# Patient Record
Sex: FEMALE | Race: WHITE | NOT HISPANIC OR LATINO | Employment: UNEMPLOYED
[De-identification: names, ages, dates, MRNs, and addresses within clinical notes are randomized per-mention and may not be internally consistent; named-entity substitution may affect disease eponyms.]

---

## 2017-10-15 ENCOUNTER — HEALTH MAINTENANCE LETTER (OUTPATIENT)
Age: 57
End: 2017-10-15

## 2021-03-18 ENCOUNTER — IMMUNIZATION (OUTPATIENT)
Dept: NURSING | Facility: CLINIC | Age: 61
End: 2021-03-18
Payer: COMMERCIAL

## 2021-03-18 PROCEDURE — 91300 PR COVID VAC PFIZER DIL RECON 30 MCG/0.3 ML IM: CPT

## 2021-03-18 PROCEDURE — 0001A PR COVID VAC PFIZER DIL RECON 30 MCG/0.3 ML IM: CPT

## 2021-04-08 ENCOUNTER — IMMUNIZATION (OUTPATIENT)
Dept: NURSING | Facility: CLINIC | Age: 61
End: 2021-04-08
Attending: INTERNAL MEDICINE
Payer: COMMERCIAL

## 2021-04-08 PROCEDURE — 91300 PR COVID VAC PFIZER DIL RECON 30 MCG/0.3 ML IM: CPT

## 2021-04-08 PROCEDURE — 0002A PR COVID VAC PFIZER DIL RECON 30 MCG/0.3 ML IM: CPT

## 2021-04-17 ENCOUNTER — HEALTH MAINTENANCE LETTER (OUTPATIENT)
Age: 61
End: 2021-04-17

## 2021-05-26 ENCOUNTER — RECORDS - HEALTHEAST (OUTPATIENT)
Dept: ADMINISTRATIVE | Facility: CLINIC | Age: 61
End: 2021-05-26

## 2021-09-24 ENCOUNTER — HOSPITAL ENCOUNTER (EMERGENCY)
Facility: HOSPITAL | Age: 61
Discharge: HOME | End: 2021-09-25
Payer: COMMERCIAL

## 2021-09-24 VITALS
DIASTOLIC BLOOD PRESSURE: 95 MMHG | BODY MASS INDEX: 19.83 KG/M2 | RESPIRATION RATE: 16 BRPM | OXYGEN SATURATION: 99 % | HEIGHT: 61 IN | SYSTOLIC BLOOD PRESSURE: 203 MMHG | HEART RATE: 79 BPM | TEMPERATURE: 98 F | WEIGHT: 105 LBS

## 2021-09-24 ASSESSMENT — MIFFLIN-ST. JEOR: SCORE: 988.66

## 2021-09-25 NOTE — ED TRIAGE NOTES
She states she went o plastic surgeon 2 weeks ago for a Ultherapy in both arms and now has shooting pains in both arms. She tried aspirin and tylenol with no releif, she also tried valium. Pain a 10.

## 2021-09-26 ENCOUNTER — HEALTH MAINTENANCE LETTER (OUTPATIENT)
Age: 61
End: 2021-09-26

## 2021-10-19 ENCOUNTER — TRANSFERRED RECORDS (OUTPATIENT)
Dept: HEALTH INFORMATION MANAGEMENT | Facility: CLINIC | Age: 61
End: 2021-10-19

## 2021-10-25 ENCOUNTER — TRANSFERRED RECORDS (OUTPATIENT)
Dept: HEALTH INFORMATION MANAGEMENT | Facility: CLINIC | Age: 61
End: 2021-10-25

## 2021-11-21 ENCOUNTER — HEALTH MAINTENANCE LETTER (OUTPATIENT)
Age: 61
End: 2021-11-21

## 2022-04-01 NOTE — TELEPHONE ENCOUNTER
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 PCP:  10/27/21  9/29/21    Dr Murphy @ [facility]:  10/25/21  10/13/21   MEDICATION LIST Care Everywhere    IMAGING  (FOR ALL VISITS)     EMG Received [facility]:  10/19/21   MRI (HEAD, NECK, SPINE) Received [facility]:  MRA Head Neck 7/16/12  MRI Brain 7/16/12

## 2022-05-08 ENCOUNTER — HEALTH MAINTENANCE LETTER (OUTPATIENT)
Age: 62
End: 2022-05-08

## 2022-06-27 ENCOUNTER — PRE VISIT (OUTPATIENT)
Dept: NEUROLOGY | Facility: CLINIC | Age: 62
End: 2022-06-27

## 2022-06-27 ENCOUNTER — OFFICE VISIT (OUTPATIENT)
Dept: NEUROLOGY | Facility: CLINIC | Age: 62
End: 2022-06-27
Payer: COMMERCIAL

## 2022-06-27 VITALS
RESPIRATION RATE: 16 BRPM | SYSTOLIC BLOOD PRESSURE: 162 MMHG | BODY MASS INDEX: 20.6 KG/M2 | WEIGHT: 109 LBS | DIASTOLIC BLOOD PRESSURE: 81 MMHG | OXYGEN SATURATION: 99 % | HEART RATE: 82 BPM

## 2022-06-27 DIAGNOSIS — G54.0 BRACHIAL PLEXOPATHY: Primary | ICD-10-CM

## 2022-06-27 PROCEDURE — 99204 OFFICE O/P NEW MOD 45 MIN: CPT | Performed by: PSYCHIATRY & NEUROLOGY

## 2022-06-27 ASSESSMENT — PAIN SCALES - GENERAL: PAINLEVEL: MODERATE PAIN (4)

## 2022-06-27 NOTE — LETTER
Date:June 27, 2022      Provider requested that no letter be sent. Do not send.       [facility]

## 2022-06-27 NOTE — LETTER
6/27/2022       RE: Brii Rainey  [address] [facility] [address]     Dear Colleague,    Thank you for referring your patient, Brii Rainey, to the [facility] NEUROLOGY CLINIC [place] at [facility]. Please see a copy of my visit note below.    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 On 9/10/21 she underwent a cosmetic procedure called ultherapy. As part of this procedure apparently a probe is applied to the axilla and sends ultrasound into the skin. It is designed to smooth skin through collagen stimulation. The procedure lasted for 30 min on the left and 45 minutes on the right. During the procedure she experienced electrical shocks into her elbows, forearm and fingers. Locally in her axilla she felt a warm sensation. Immediately after the procedure she hand numbness and burning in her 4th and 5th digits as well as her medial forearms on both sides. The right side was more affected than the left. In addition to numbness and pain she felt weaker in her right hand. She was treated with gabapentin and oxycodone. Pain initially was very severe. She has subsequently been able to wean off both. The electric burning pain has improved but has not resolved. She also continues to have numbness in the medial fingers and forearm as well as right hand weakness. This has not improved.     She was subsequently seen at [facility] and at [facility]. Three EMGs have been performed as well as an MRI of her right elbow and brachial plexus. This eventually lead to right ulnar nerve decompression in 4/22. There has been no improvement in her symptoms since that surgery.     Prior pertinent radiology work-up:  1/14/22: MRI right elbow showed small effusion at elbow. No nerve impingement.   1/14/22: MRI right  brachial plexus with and without contrast showed no abnormalities in the plexus    Prior electrophysiologic work-up:  10/19/21: Nerve conduction studies/EMG performed at [facility] neurology. On the report it was interpreted as a right and left medial cord plexopathy and an ulnar neuropathy. Data from the study is somewhat hard to interpret, but reported was an absent left MAC, reduced right KATHARINA, reduced left ulnar-D5 snap, small right ulnar-ADM motor with possible slowing across the elbow. The other motor and sensory responses were normal.   12/21/21: NCS/EMG at [facility]. I do not have the data from that study. A description of the findings states that the bilateral LAC and MACs were normal. The left ulnar and bilateral median snaps normal. Right ulnar snap reduced amplitude. Median motor both normal. Right ulnar showed reduced amplitued and CV slowing across elbow. Left ulnar motor normal.   6/23/22: NCS/EMG at [facility]. Just the right side studies. The right ulnar motor and sensory responses are reported as normal. EMG findings are harder to interpret based on the report, but some chronic reinnervation changes were reported in FDI, FCU, ADM, APB and triceps.     Past Medical History:   Past Medical History:   Diagnosis Date     DDD (degenerative disc disease), cervical 10 years ago     Raynaud disease 2003     Past Surgical History:  Past Surgical History:   Procedure Laterality Date     HC SUCT EMANUEL LIPECTOMY,LOW EXTREM  07/13/99     HC SUCT EMANUEL LIPECTOMY,TRUNK  02/2003     IR LUMBAR EPIDURAL STEROID INJECTION  6/7/2002     LAMINECTOMY LUMBAR MINIMALLY INVASIVE ONE LEVEL  6/2002     RECONSTRUCT BREAST, IMPLANT PROSTHESIS, COMBINED  11/1997     Family history:    There is no known family history of hereditary neuropathies or other neuromuscular disorders.    Social History:    She denies tobacco, alcohol, or illicit drug use.     Medical Allergies:    Allergies   Allergen Reactions     Pcn  [Penicillins] Hives     Current Medications:    Current Outpatient Medications   Medication     Calcium Carbonate-Vitamin D (CALCIUM + D PO)     Misc Natural Products ([employer] GLUCOS-CHONDROIT-MSM TS PO)     Misc Natural Products (FIBER 7) POWD     omega-3 fatty acids (FISH OIL) 1200 MG capsule     No current facility-administered medications for this visit.     Review of Systems: A complete review of systems was obtained and was negative except for what was noted above.    Physical examination:    BP (!) 162/81   Pulse 82   Resp 16   Wt 49.4 kg (109 lb)   LMP 05/15/2012   SpO2 99%   BMI 20.60 kg/m       General Appearance: NAD    Skin: There are no rashes or other skin lesions.    Musculoskeletal:  There is no scoliosis, lordosis, kyphosis, pes cavus, or hammertoes.    Neurologic examination:    Mental status:  Patient is alert, attentive, and oriented x 3.  Language is coherent and fluent without  aphasia.  Memory, comprehension and ability to follow commands were intact.       Cranial nerves:   Pupils were round and reacted to light.  Extraocular movements were full. There was no face, jaw, palate or tongue weakness or atrophy. Hearing was grossly intact.  Shoulder shrug was normal.       Motor exam: There is atrophy of right FDI and probably APB. No fasciculations.   Manual muscle testing revealed the following MRC grade muscle power:   Right Left   Neck flexion 5    Neck extension: 5    Shoulder abduction:  5 5   Elbow extension: 5 5   Elbow flexion:  5 5   Wrist flexion:  5 5   Wrist flexion:     Finger extension:  4 5   APB 4 5   FDI 4- 5   Hip Flexion 5 5   Knee extension 5 5   Dorsiflexion 5 5   Plantar flexion 5 5     Complex motor skills: No tremor or ataxia    Sensory exam: Pin reduced on right digits 4/5 and medial palm. Pin intact on left. Vibration intact.     Gait: Narrow and stable.      Deep tendon reflexes:   Right Left   Triceps 2 2   Biceps 2 2   Brachioradialis 2 2   Knee jerk 2 2   Ankle  jerk 2 2   Plantar responses were flexor bilaterally.       Assessment and plan:    Brii Rainey is a 61 year old woman who developed medial cord plexopathies after a microfocused ultrasound procedure called ultherapy was administrated to her axilla. Although the many NCS/EMGs she has had over the last several months are somewhat difficult to interpret, on clinical grounds localization to the medial cord is convincing (especially on the right). We discussed prognosis of nerve repair and regeneration, which typically occurs over 18 to 24 months following injury. In some cases recovery may not be complete.  She is now about 9 months since onset. It is reassuring to find that the NCS/EMG from last week showed normalization of the ulnar motor and sensory responses and only chronic changes on EMG.  I am optimistic that she will continue to make some improvements over the next 9 to 12 months, although some degree of residual irreversible weakness and numbness are possible. Unfortunately there is no intervention known to speed up or improve the nerve repair process. I encouraged continued optimization of general nutrition and activity as she is doing, but beyond that I have no other evidence based recommendations. I will see her back in 4 months to assess for clinical changes. Should she develop symptoms in currently unaffected areas I would like her to let me know before then.     ---      Again, thank you for allowing me to participate in the care of your patient.      Sincerely,    Lex Bernard MD

## 2022-06-27 NOTE — PROGRESS NOTES
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 On 9/10/21 she underwent a cosmetic procedure called ultherapy. As part of this procedure apparently a probe is applied to the axilla and sends ultrasound into the skin. It is designed to smooth skin through collagen stimulation. The procedure lasted for 30 min on the left and 45 minutes on the right. During the procedure she experienced electrical shocks into her elbows, forearm and fingers. Locally in her axilla she felt a warm sensation. Immediately after the procedure she hand numbness and burning in her 4th and 5th digits as well as her medial forearms on both sides. The right side was more affected than the left. In addition to numbness and pain she felt weaker in her right hand. She was treated with gabapentin and oxycodone. Pain initially was very severe. She has subsequently been able to wean off both. The electric burning pain has improved but has not resolved. She also continues to have numbness in the medial fingers and forearm as well as right hand weakness. This has not improved.     She was subsequently seen at [facility] and at [facility]. Three EMGs have been performed as well as an MRI of her right elbow and brachial plexus. This eventually lead to right ulnar nerve decompression in 4/22. There has been no improvement in her symptoms since that surgery.     Prior pertinent radiology work-up:  1/14/22: MRI right elbow showed small effusion at elbow. No nerve impingement.   1/14/22: MRI right brachial plexus with and without contrast showed no abnormalities in the plexus    Prior electrophysiologic work-up:  10/19/21: Nerve conduction studies/EMG performed at [facility] of neurology. On the report it was interpreted as a right and left medial cord plexopathy and an ulnar neuropathy. Data from the  study is somewhat hard to interpret, but reported was an absent left MAC, reduced right KATHARINA, reduced left ulnar-D5 snap, small right ulnar-ADM motor with possible slowing across the elbow. The other motor and sensory responses were normal.   12/21/21: NCS/EMG at [facility]. I do not have the data from that study. A description of the findings states that the bilateral LAC and MACs were normal. The left ulnar and bilateral median snaps normal. Right ulnar snap reduced amplitude. Median motor both normal. Right ulnar showed reduced amplitued and CV slowing across elbow. Left ulnar motor normal.   6/23/22: NCS/EMG at [facility]. Just the right side studies. The right ulnar motor and sensory responses are reported as normal. EMG findings are harder to interpret based on the report, but some chronic reinnervation changes were reported in FDI, FCU, ADM, APB and triceps.     Past Medical History:   Past Medical History:   Diagnosis Date     DDD (degenerative disc disease), cervical 10 years ago     Raynaud disease 2003     Past Surgical History:  Past Surgical History:   Procedure Laterality Date     HC SUCT EMANUEL LIPECTOMY,LOW EXTREM  07/13/99     HC SUCT EMANUEL LIPECTOMY,TRUNK  02/2003     IR LUMBAR EPIDURAL STEROID INJECTION  6/7/2002     LAMINECTOMY LUMBAR MINIMALLY INVASIVE ONE LEVEL  6/2002     RECONSTRUCT BREAST, IMPLANT PROSTHESIS, COMBINED  11/1997     Family history:    There is no known family history of hereditary neuropathies or other neuromuscular disorders.    Social History:    She denies tobacco, alcohol, or illicit drug use.     Medical Allergies:    Allergies   Allergen Reactions     Pcn [Penicillins] Hives     Current Medications:    Current Outpatient Medications   Medication     Calcium Carbonate-Vitamin D (CALCIUM + D PO)     Misc Natural Products ([employer]S GLUCOS-CHONDROIT-MSM TS PO)     Misc Natural Products (FIBER 7) POWD     omega-3 fatty acids (FISH OIL) 1200 MG capsule     No current facility-administered  medications for this visit.     Review of Systems: A complete review of systems was obtained and was negative except for what was noted above.    Physical examination:    BP (!) 162/81   Pulse 82   Resp 16   Wt 49.4 kg (109 lb)   LMP 05/15/2012   SpO2 99%   BMI 20.60 kg/m       General Appearance: NAD    Skin: There are no rashes or other skin lesions.    Musculoskeletal:  There is no scoliosis, lordosis, kyphosis, pes cavus, or hammertoes.    Neurologic examination:    Mental status:  Patient is alert, attentive, and oriented x 3.  Language is coherent and fluent without  aphasia.  Memory, comprehension and ability to follow commands were intact.       Cranial nerves:   Pupils were round and reacted to light.  Extraocular movements were full. There was no face, jaw, palate or tongue weakness or atrophy. Hearing was grossly intact.  Shoulder shrug was normal.       Motor exam: There is atrophy of right FDI and probably APB. No fasciculations.   Manual muscle testing revealed the following MRC grade muscle power:   Right Left   Neck flexion 5    Neck extension: 5    Shoulder abduction:  5 5   Elbow extension: 5 5   Elbow flexion:  5 5   Wrist flexion:  5 5   Wrist flexion:     Finger extension:  4 5   APB 4 5   FDI 4- 5   Hip Flexion 5 5   Knee extension 5 5   Dorsiflexion 5 5   Plantar flexion 5 5     Complex motor skills: No tremor or ataxia    Sensory exam: Pin reduced on right digits 4/5 and medial palm. Pin intact on left. Vibration intact.     Gait: Narrow and stable.      Deep tendon reflexes:   Right Left   Triceps 2 2   Biceps 2 2   Brachioradialis 2 2   Knee jerk 2 2   Ankle jerk 2 2   Plantar responses were flexor bilaterally.       Assessment and plan:    Brii Rainey is a 61 year old woman who developed medial cord plexopathies after a microfocused ultrasound procedure called ultherapy was administrated to her axilla. Although the many NCS/EMGs she has had over the last several months are  somewhat difficult to interpret, on clinical grounds localization to the medial cord is convincing (especially on the right). We discussed prognosis of nerve repair and regeneration, which typically occurs over 18 to 24 months following injury. In some cases recovery may not be complete.  She is now about 9 months since onset. It is reassuring to find that the NCS/EMG from last week showed normalization of the ulnar motor and sensory responses and only chronic changes on EMG.  I am optimistic that she will continue to make some improvements over the next 9 to 12 months, although some degree of residual irreversible weakness and numbness are possible. Unfortunately there is no intervention known to speed up or improve the nerve repair process. I encouraged continued optimization of general nutrition and activity as she is doing, but beyond that I have no other evidence based recommendations. I will see her back in 4 months to assess for clinical changes. Should she develop symptoms in currently unaffected areas I would like her to let me know before then.     ---

## 2022-11-23 ENCOUNTER — OFFICE VISIT (OUTPATIENT)
Dept: NEUROLOGY | Facility: CLINIC | Age: 62
End: 2022-11-23
Payer: COMMERCIAL

## 2022-11-23 VITALS
BODY MASS INDEX: 20.22 KG/M2 | HEART RATE: 72 BPM | WEIGHT: 107 LBS | DIASTOLIC BLOOD PRESSURE: 84 MMHG | SYSTOLIC BLOOD PRESSURE: 143 MMHG | OXYGEN SATURATION: 99 %

## 2022-11-23 DIAGNOSIS — G54.0 BRACHIAL PLEXOPATHY: Primary | ICD-10-CM

## 2022-11-23 PROCEDURE — 99214 OFFICE O/P EST MOD 30 MIN: CPT | Performed by: PSYCHIATRY & NEUROLOGY

## 2022-11-23 RX ORDER — MULTIVITAMIN WITH IRON
1 TABLET ORAL DAILY
COMMUNITY

## 2022-11-23 NOTE — LETTER
11/23/2022       RE: Brii Rainey  [address] [facility] [address]     Dear Colleague,    Thank you for referring your patient, Brii Rainey, to the [facility] NEUROLOGY CLINIC [place] at [facility]. Please see a copy of my visit note below.    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 As part of this procedure apparently a probe is applied to the axilla and sends ultrasound into the skin. It is designed to smooth skin through collagen stimulation. The procedure lasted 30 min on the left and 45 minutes on the right. During the procedure she experienced electrical shocks into her elbows, forearm and fingers. Immediately after the procedure she hand numbness and burning in her 4th and 5th digits as well as her medial forearms on both sides. The right side was more affected than the left. In addition to numbness and pain she felt weaker in her right hand. Pain initially was very severe.  The electric burning pain improved over time. Numbness persisted in the medial fingers and forearm. Three EMGs have been performed as well as an MRI of her right elbow and brachial plexus, detailed below. One study 12/21 reported ulnar neuropathy across the elbow, which lead to ulnar nerve decompression on the right. This did not help her symptoms.     Interval history:   I last saw her 6/27/22.  Overall she is about the same as at her last visit. On the right she has numbness on the medial forearm and digits 4/5. Pain affects the same area. She also has residual hand weakness and tightness in her forearm. On the left numbness persists in digits 2 and 3. Pain is especially problematic in her 3rd digit. She has burning in her forearm. Weakness affects her fingers, 2 and 3. No pain in her upper arm or axilla. That has improved. She has weaned off  all pain medications, including gabapentin.    Prior pertinent radiology work-up:  1/14/22: MRI right elbow showed small effusion at elbow. No nerve impingement.   1/14/22: MRI right brachial plexus with and without contrast showed no abnormalities in the plexus    Prior electrophysiologic work-up:  10/19/21: Nerve conduction studies/EMG performed at [facility] of neurology. On the report it was interpreted as a right and left medial cord plexopathy and an ulnar neuropathy. Data from the study is somewhat hard to interpret, but reported was an absent left MAC, reduced right KATHARINA, reduced left ulnar-D5 snap, small right ulnar-ADM motor with possible slowing across the elbow. The other motor and sensory responses were normal.   12/21/21: NCS/EMG at [facility]. I do not have the data from that study. A description of the findings states that the bilateral LAC and MACs were normal. The left ulnar and bilateral median snaps normal. Right ulnar snap reduced amplitude. Median motor both normal. Right ulnar showed reduced amplitued and CV slowing across elbow. Left ulnar motor normal.   6/23/22: NCS/EMG at [facility]. Just the right side studies. The right ulnar motor and sensory responses are reported as normal. EMG findings are harder to interpret based on the report, but some chronic reinnervation changes were reported in FDI, FCU, ADM, APB and triceps.     Past Medical History:   Past Medical History:   Diagnosis Date     DDD (degenerative disc disease), cervical 10 years ago     Raynaud disease 2003     Past Surgical History:  Past Surgical History:   Procedure Laterality Date     HC SUCT EMANUEL LIPECTOMY,LOW EXTREM  07/13/99     HC SUCT EMANUEL LIPECTOMY,TRUNK  02/2003     IR LUMBAR EPIDURAL STEROID INJECTION  6/7/2002     LAMINECTOMY LUMBAR MINIMALLY INVASIVE ONE LEVEL  6/2002     RECONSTRUCT BREAST, IMPLANT PROSTHESIS, COMBINED  11/1997     Family history:    There is no known family history of hereditary  neuropathies or other neuromuscular disorders.    Social History:    She denies tobacco, alcohol, or illicit drug use.     Medical Allergies:    Allergies   Allergen Reactions     Pcn [Penicillins] Hives     Current Medications:    Current Outpatient Medications   Medication     Calcium Carbonate-Vitamin D (CALCIUM + D PO)     Misc Natural Products ([employer]S GLUCOS-CHONDROIT-MSM TS PO)     Misc Natural Products (FIBER 7) POWD     omega-3 fatty acids (FISH OIL) 1200 MG capsule     vitamin C with B complex (B COMPLEX-C) tablet     No current facility-administered medications for this visit.     Review of Systems: A complete review of systems was obtained and was negative except for what was noted above.    Physical examination:    BP (!) 143/84   Pulse 72   Wt 48.5 kg (107 lb)   LMP 05/15/2012   SpO2 99%   BMI 20.22 kg/m       General Appearance: NAD    Neurologic examination:    Mental status:  Patient is alert, attentive, and oriented x 3.  Language is coherent and fluent without  aphasia.  Memory, comprehension and ability to follow commands were intact.       Cranial nerves:   Pupils were round and reacted to light.  Extraocular movements full. No face, jaw, palate or tongue weakness or atrophy. Hearing was grossly intact.    Motor exam:  Manual muscle testing revealed the following MRC grade muscle power:   Right Left   Neck flexion 5    Neck extension: 5    Shoulder abduction:  5 5   Elbow extension: 5 5   Elbow flexion:  5 5   Wrist flexion:  5 5   Wrist flexion: 5 5   Finger extension:  4 5   APB 4 5   FDI 4 5   Hip Flexion 5 5   Knee extension 5 5   Dorsiflexion 5 5   Plantar flexion 5 5   Red indicates worse when compared to last examination  Green indicates improved when compared to last examination    Complex motor skills: No tremor or ataxia    Sensory exam: Pin is patchy in her fingers on both sides. There is a relatively reduced perception of pin and light touch in her medial hand and forearm, but  patchy. The lateral portions of her hands and forearms are more consistently intact. Vibration intact.     Gait: Narrow and stable.      Deep tendon reflexes:   Right Left   Triceps 2 2   Biceps 2 2   Brachioradialis 2 2   Knee jerk 2 2   Ankle jerk 2 2      Assessment and plan:    Brii Rainey is a 62 year old woman who developed brachial plexopathies after a microfocused ultrasound procedure called ultherapy was administrated to her axilla. In clinical grounds the medial cord is the most affected portion of the plexus, especially on the right. She is now about 14 months out from the injury. We discussed prognosis of nerve repair and regeneration, which typically occurs over 18 to 24 months following injury. In some cases recovery may not be complete, and considering that her symptoms and examination are essentially unchanged today compared to her last visit meaningful recovery beyond this point is probably unlikely. I am going to repeat the NCS/EMG to look for evidence of reinnervation. That may provide some additional prognostic data. We also discussed the role of supportive management. I provided her with an occupational therapy referral to help with right hand function.  I will also see her back in 6 months for neurologic surveillance.   ---        Again, thank you for allowing me to participate in the care of your patient.      Sincerely,    Lex Bernard MD
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
## 2022-12-02 ENCOUNTER — TELEPHONE (OUTPATIENT)
Dept: NEUROLOGY | Facility: CLINIC | Age: 62
End: 2022-12-02

## 2022-12-02 NOTE — TELEPHONE ENCOUNTER
[facility] Call Center    Phone Message    May a detailed message be left on voicemail: yes     Reason for Call: Patient's  requesting a call back to discuss patient's injuries.  Luis Pace  [phone]    Action Taken: Message routed to:  [facility] ([facility]    Travel Screening: Not Applicable

## 2022-12-10 NOTE — PROGRESS NOTES
Hand Therapy Initial Evaluation    Current Date:  12/12/2022    Diagnosis: Brachial plexopathy   DOI: 11/23/22 (script date); referred by Lex Myles; onset on 9/10/21 after undergoing cosmetic ultherapy      Per recent MD visit on 11/23/22, patient presented with ongoing numbness on the medial forearm and digits 4-5 in R, along with residual hand weakness and tightness in her forearm; persistent left hand numbness (digits 2-3)     Subjective:  Brii Rainey is a 62 year old female.    Patient reports symptoms of the brachial plexopathy  which occurred after cosmetic ultherapy on 9/10/21. Since onset symptoms are Unchanged.     Answers for HPI/ROS submitted by the patient on 12/12/2022  Reason for Visit:: Pain in both forearms and tingling/numbness in fingers. Weakness in right hand  When problem began:: 9/10/2021  How problem occurred:: Ultherapy treatment in armpit/upper arm.  Number scale: 6/10  General health as reported by patient: excellent  Please check all that apply to your current or past medical history: pain at night/rest, weakness  Medical allergies: other  Other Allergies Detail: PCN  Surgeries: orthopedic surgery  Occupation:: unemployed  What are your primary job tasks: lifting/carrying, pushing/pulling       Occupational Profile Information:  Right hand dominant  Prior functional level: no limitations  Patient reports symptoms of pain, weakness/loss of strength, numbness and tingling   Special tests:  EMG/NCS and MRI.    Previous treatment: none  Barriers include:none  Mobility: No difficulty  Transportation: drives    Functional Outcome Measure:   Upper Extremity Functional Index Score:  SCORE:   Column Totals: /80: (P) 63   (A lower score indicates greater disability.)    Objective:  Pain Level (Scale 0-10)   12/12/2022   At Rest 5/10    With Use 5/10      Pain Description  Date 12/12/2022   Location  R:RF and SF, wrist, flexor wad   L: LF and RF, flexor wad    Pain Quality Aching and  Burning   Frequency intermittent or constant     Pain is worst  daytime or nighttime   Exacerbated by  with use    Relieved by Hand therapy in the past; stretch    Progression Unchanged      Edema  None    Sensation   R: constant numbness, tingling, and burning sensation in RF and SF and forearm along ulnar nerve distribution   L: constant numbness, tingling, and burning sensation in LF and RF and forearm along ulnar nerve distribution; reported hypersensitivity along medial aspect of forearm and medial aspect of upper arm;     Sensory Scan  + indicates diminished sensation to light touch  Date: 12/12/22 Right Left    Diminished compared to Left Diminished compared to Right   Supraclavicular (C4) - -   Anterolateral upper arm (C5) - -   Lateral Forearm/thumb (C6) - -   Index and Middle Finger (C7) +   +, 'just the middle finger'   Ulnar Hand (C8) + + 'just the ring finger'   Anteromedial forearm (T1)  + +, also reports 'hypersensitivity'      ROM   WNL B shoulder, elbow, wrist and digit AROM     *reports h/o RTC injury to right shoulder     Strength  MMT Ulnar Nerve  Scale 0-5/5   12/12/2022 12/12/22    L R   FCU 5/5 5/5   FDP III, IV 5/5 5/5   Palmar Interossei 4/5  4+/5   Dorsal Interossei* 4+/5  5/5   Lumbricals III, IV 4/5  5/5   Adductor Pollicis 5/5 5/5   FPB 5/5 5/5       Strength   (Measured in pounds)  Pain Report: - none  + mild    ++ moderate    +++ severe    12/12/2022 12/12/2022   Trials L R   1  2  3 45# 40#   Average 45#  40#     Lat Pinch 12/12/2022 12/12/2022   Trials L R   1  2  3 9# 8#   Average 9# 8#     Assessment:  Patient presents with symptoms consistent with diagnosis of brachial plexopathy,  with conservative intervention.     Patient's limitations or Problem List includes:  Pain, Sensory disturbance, Decreased , Decreased pinch and Tightness in musculature of the bilateral hand which interferes with the patient's ability to perform Self Care Tasks (dressing), Work Tasks, Sleep  Patterns, Recreational Activities and Household Chores as compared to previous level of function.    Rehab Potential:  Good - Return to full activity, some limitations    Patient will benefit from skilled Occupational Therapy to increase flexibility,  strength, pinch strength and sensation and decrease pain and tightness in musculature to return to previous activity level and resume normal daily tasks and to reach their rehab potential.    Barriers to Learning:  No barrier    Communication Issues:  Patient appears to be able to clearly communicate and understand verbal and written communication and follow directions correctly.    Chart Review: Brief history including review of medical and/or therapy records relating to the presenting problem    Identified Performance Deficits: dressing, home establishment and management, meal preparation and cleanup, shopping, work and leisure activities    Assessment of Occupational Performance:  5 or more Performance Deficits    Clinical Decision Making (Complexity): Low complexity    Treatment Explanation:  The following has been discussed with the patient:  RX ordered/plan of care  Anticipated outcomes  Possible risks and side effects    Plan:  Frequency:  1 X week, once daily  Duration:  for 8 weeks    Treatment Plan:   Modalities:  Paraffin  Therapeutic Exercise:  Place and Hold, Contract Relax, Isotonics, Isometrics and Stabilization  Neuromuscular re-education:  Nerve Gliding, Posture, Isometrics and Stabilization, kinesiotaping  Manual Techniques:  Friction massage and Myofascial release  Orthotic Fabrication:  Static  Self Care:  Self Care Tasks, Ergonomic Considerations and Work Tasks    Discharge Plan:  Achieve all LTG.  Independent in home treatment program.  Reach maximal therapeutic benefit.    Home Exercise Program:  Median Nerve Mobility  sets: 2x/day  Repetitions: 10 reps  Notes:  must be pain-free; discontinue if pain or increase in neuropathy occurs;  Keep  your arms to the side of body  Nerve Gliding Distal Ulnar  Notes:  must be pain-free; discontinue if pain or increase in neuropathy occurs;  Keep your arms to the side of body  Ball Massage to Flexors  Sets: 2-3x/day  Repetitions: 1-2 minutes  Notes:  must be pain-free;  Education Sheet General  Notes:  desensitization for left arm; start brushing soft, light texture against forearm, or sensitive area in the arm; then progressed to rough texture as tolerated    Next Visit:  Assess median nerve MMT   STM to FA flexor wad   Desensitization of left upper arm/forearm   Progress HEP as appropriate   KT tape?   AE education for dressing

## 2022-12-12 ENCOUNTER — THERAPY VISIT (OUTPATIENT)
Dept: OCCUPATIONAL THERAPY | Facility: CLINIC | Age: 62
End: 2022-12-12
Attending: PSYCHIATRY & NEUROLOGY
Payer: COMMERCIAL

## 2022-12-12 DIAGNOSIS — G54.0 BRACHIAL PLEXOPATHY: ICD-10-CM

## 2022-12-12 DIAGNOSIS — G56.92 NEUROPATHY OF LEFT HAND: ICD-10-CM

## 2022-12-12 DIAGNOSIS — G56.91 NEUROPATHY OF RIGHT HAND: ICD-10-CM

## 2022-12-12 DIAGNOSIS — M79.642 BILATERAL HAND PAIN: ICD-10-CM

## 2022-12-12 DIAGNOSIS — M79.641 BILATERAL HAND PAIN: ICD-10-CM

## 2022-12-12 PROCEDURE — 97530 THERAPEUTIC ACTIVITIES: CPT | Mod: GO

## 2022-12-12 PROCEDURE — 97112 NEUROMUSCULAR REEDUCATION: CPT | Mod: GO

## 2022-12-12 PROCEDURE — 97165 OT EVAL LOW COMPLEX 30 MIN: CPT | Mod: GO

## 2023-02-01 ENCOUNTER — OFFICE VISIT (OUTPATIENT)
Dept: NEUROLOGY | Facility: CLINIC | Age: 63
End: 2023-02-01
Payer: COMMERCIAL

## 2023-02-01 DIAGNOSIS — G54.0 BRACHIAL PLEXOPATHY: ICD-10-CM

## 2023-02-01 PROCEDURE — 95912 NRV CNDJ TEST 11-12 STUDIES: CPT | Performed by: PSYCHIATRY & NEUROLOGY

## 2023-02-01 PROCEDURE — 95886 MUSC TEST DONE W/N TEST COMP: CPT | Performed by: PSYCHIATRY & NEUROLOGY

## 2023-02-01 NOTE — LETTER
2023       RE: Brii Rainey  [address] [facility] [address]     Dear Colleague,    Thank you for referring your patient, Brii Rainey, to the [facility] EMG [facility] [place] at [facility]. Please see a copy of my visit note below.                        [facility]  Electrodiagnostic Laboratory                 Department of Neurology                                                                                                         Test Date:  2023    Patient: Nicole Rainey : 1960 Physician: Lex Bernard MD   Sex: Male AGE: 62 year Ref Phys: Lex Bernard MD   ID#: [id number]   Technician: Nancy     History and Examination:  62 year old woman who developed pain and sensory changes in hands axilla, forearm and hand after a cosmetic procedure called ultherapy. As part of this procedure apparently a probe is applied to the axilla and sends ultrasound into the skin. This study is performed to assess for brachial plexopathy.     Techniques:  Motor and sensory conduction studies were done with surface recording electrodes. EMG was done with a concentric needle electrode.     Results:  Nerve conduction studies:  1. Left MAC sensory response is absent.   2. Right MAC, bilateral LAC, bilateral median-D2, bilateral ulnar-D5, and right radial sensory responses are normal.   3. Bilateral ulnar-ADM motor response shows normal DL, normal amplitude and CV slowing across the elbows.   4. Bilateral median-APB and ulnar-ADM motor responses are normal.     Needle EM. Fibrillation potentials and positive sharp waves were seen in the right FDI and PT muscless.   2. Large amplitude and/or long duration motor unit potentials (MUP) were seen in the right FDI and PT muscles. MUPs with increased polyphasia were also seen in the left FDI muscle.     Interpretation:  This is an abnormal study. There is electrophysiologic evidence of bilateral  ulnar neuropathies across the elbows on both sides. In addition, the left medial antebrachial cutaneous (MAC) sensory response is absent.  Although MAC sensory responses may be absent in injuries to the medial cord of the brachial plexus, this finding in isolation is insufficient to make an electrophysiologic diagnosis of brachial plexopathy with certainty. Also noted are denervation changes to the right pronator teres muscle. The absence of similar changes to other muscles supplied by overlapping portions of the brachial plexus or nerve roots precludes localization of the pronator teres abnormality, but does not exclude a patchy brachial plexus injury.     Lex Bernard MD  Department of Neurology        Nerve Conduction Studies  Motor Sites      Latency Amplitude Neg. Amp Diff Distance Velocity Neg. Dur Neg Area Diff Temperature Comment   Site (ms) Norm (mV) Norm % cm m/s Norm ms %  C    Left Median (APB) Motor   Wrist 3.6  < 4.4 5.0  > 5.0  8   4.8  32.1    Elbow 7.5 - 5.2 - 4.0 20 51  > 48 4.7 3.7 32    Right Median (APB) Motor   Wrist 3.6  < 4.4 6.9  > 5.0  8   4.6  32.1    Elbow 7.2 - 6.9 - 0 21 58  > 48 4.4 -2.2 32    Left Ulnar (ADM) Motor   Wrist 2.8  < 3.5 6.3  > 5.0  8   6.3  30.9    Bel Elbow 5.5 - 5.5 - -12.7 15.5 57  > 48 6.1 -9.4 30.5    Abv Elbow 7.1 - 5.3 - -3.6 7 44  > 48 - - 29.9    Up Arm 8.5 - 4.9 -     6.3  30.1 10   Right Ulnar (ADM) Motor   Wrist 3.0  < 3.5 5.4  > 5.0  8   6.1  31.5    Bel Elbow 6.6 - 4.9 - -9.3 19 53  > 48 5.7 -11.5 31.5    Abv Elbow 8.4 - 4.8 - -2.0 8 44  > 48 5.7 -1.78 31.5    Up Arm 10.0 - 4.7 -     5.7  31.4 10   Left Ulnar (FDI) Motor   Wrist 4.9 - 6.5 -     3.9  32    Bel Elbow 7.6 - 5.4 - -16.9 15.5 57  > 48 4.0 -15.2 31.8    Abv Elbow 8.9 - 5.2 - -3.7 6.5 50  > 48 4.4 0.94 31.5    Erb's Pt. 10.4 - 4.3 - -17.3 8.5 57 - 4.7 -14.0 31.3    Right Ulnar (FDI) Motor   Wrist 4.5 - 5.0 -     4.5  31.5    Bel Elbow 7.9 - 4.3 - -14.0 18 53  > 48 4.6 -8.6 31.5    Abv Elbow 10.0  - 3.8 - -11.6 9 43  > 48 4.5 -12.8 31.4    Erb's Pt. 11.8 - 3.9 - 2.6 10 56 - 4.6 -0.98 31.4      Sensory Sites      Onset Lat Peak Lat Amp (O-P) Amp (P-P) Segment Distance Velocity Temperature   Site ms ms  V Norm  V  cm m/s Norm  C   Left Lateral Antebrachial Cutaneous Sensory   Lat Biceps-Lat Forearm 1.35 1.70 10 - 11 Lat Biceps-Lat Forearm 9 67 - 29.4   Right Lateral Antebrachial Cutaneous Sensory   Lat Biceps-Lat Forearm 1.38 1.95 18 - 17 Lat Biceps-Lat Forearm 9 65 - 32.6   Left Medial Antebrachial Cutaneous Sensory   Elbow-Med Forearm NR NR NR - NR Elbow-Med Forearm - NR - 24.9   Right Medial Antebrachial Cutaneous Sensory   Elbow-Med Forearm 1.50 2.0 9 - 9 Elbow-Med Forearm 9 60 - 32.6   Left Median Sensory   Wrist-Dig II 2.6 3.5 39  > 10 46 Wrist-Dig II 14 54  > 48 31.1   Right Median Sensory   Wrist-Dig II 2.7 3.5 29  > 10 48 Wrist-Dig II 14 52  > 48 31.2   Right Radial Sensory   Forearm-Wrist 1.70 2.3 35  > 15 48 Forearm-Wrist 10 59 - 32.7   Left Ulnar Sensory   Wrist-Dig V 2.5 3.3 29  > 8 28 Wrist-Dig V 12.5 50  > 48 24.8   Right Ulnar Sensory   Wrist-Dig V 2.5 3.2 16  > 8 33 Wrist-Dig V 12.5 50  > 48 32.1     Inter-Nerve Comparisons     Nerve 1 Value 1 Nerve 2 Value 2 Parameter Result Normal   Sensory Sites   R Median Palm-Wrist 2.0 ms R Ulnar Palm-Wrist 2.1 ms Peak Lat Diff 0.12 ms <0.30   L Median Palm-Wrist 1.7 ms L Ulnar Palm-Wrist 1.9 ms Peak Lat Diff 0.20 ms <0.30     F Wave Studies     Min-F Max-F Dispersion Persistence Mean-F F-Norm L-R Mean-F L-R Mean-F Norm F/M Ratio F-M Lat (ms)   Right Median (Abd Poll Brev)  31.7  C   25.16 25.70 0.54 83.33 25.50 <33  <2.2 3.02 22.11   Right Ulnar (Abd Dig Min)  32.6  C   30.08 30.63 0.55 30.00 30.36 <36  <2.5 1.80 25.31       Electromyography     Side Muscle Ins Act Fibs/PSW Fasc HF Amp Dur Poly Recrt Int Pat   Left Deltoid Nml None Nml 0 Nml Nml 0 Nml Nml   Left Biceps Nml None Nml 0 Nml Nml 0 Nml Nml   Left Triceps Nml None Nml 0 Nml Nml 0 Nml Nml   Left  Pronator Teres Nml None Nml 0 Nml Nml 0 Nml Nml   Left EIP Nml None Nml 0 Nml Nml 0 Nml Nml   Left FDI Nml None Nml 0 Nml Nml 2+ Nml Nml   Left FPL Nml None Nml 0 Nml Nml 0 Nml Nml   Right Deltoid Nml None Nml 0 Nml Nml 0 Nml Nml   Right Biceps Nml None Nml 0 Nml Nml 0 Nml Nml   Right Triceps Nml None Nml 0 Nml Nml 0 Nml Nml   Right Pronator Teres Incr 2+ Nml 0 2+ Nml 2+ Nml Nml   Right FDI Incr 2+ Nml 0 2+ 2+ 0 Nml Nml   Right FPL Nml None Nml 0 Nml Nml 0 Nml Nml   Right EIP Nml None Nml 0 Nml Nml 0 Nml Nml         NCS Waveforms:    Motor                           Sensory                                                 Sincerely,    Lex Bernard MD

## 2023-02-01 NOTE — PROGRESS NOTES
[facility]  Electrodiagnostic Laboratory                 Department of Neurology                                                                                                         Test Date:  2023    Patient: Nicole Rainey : 1960 Physician: Lex Bernard MD   Sex: Male AGE: 62 year Ref Phys: Lex Bernard MD   ID#: [id number]   Technician: Nancy     History and Examination:  62 year old woman who developed pain and sensory changes in hands axilla, forearm and hand after a cosmetic procedure called ultherapy. As part of this procedure apparently a probe is applied to the axilla and sends ultrasound into the skin. This study is performed to assess for brachial plexopathy.     Techniques:  Motor and sensory conduction studies were done with surface recording electrodes. EMG was done with a concentric needle electrode.     Results:  Nerve conduction studies:  1. Left MAC sensory response is absent.   2. Right MAC, bilateral LAC, bilateral median-D2, bilateral ulnar-D5, and right radial sensory responses are normal.   3. Bilateral ulnar-ADM motor response shows normal DL, normal amplitude and CV slowing across the elbows.   4. Bilateral median-APB and ulnar-ADM motor responses are normal.     Needle EM. Fibrillation potentials and positive sharp waves were seen in the right FDI and PT muscless.   2. Large amplitude and/or long duration motor unit potentials (MUP) were seen in the right FDI and PT muscles. MUPs with increased polyphasia were also seen in the left FDI muscle.     Interpretation:  This is an abnormal study. There is electrophysiologic evidence of bilateral ulnar neuropathies across the elbows on both sides. In addition, the left medial antebrachial cutaneous (MAC) sensory response is absent.  Although MAC sensory responses may be absent in injuries to the medial cord of the brachial plexus, this finding in isolation is insufficient to make an  electrophysiologic diagnosis of brachial plexopathy with certainty. Also noted are denervation changes to the right pronator teres muscle. The absence of similar changes to other muscles supplied by overlapping portions of the brachial plexus or nerve roots precludes localization of the pronator teres abnormality, but does not exclude a patchy brachial plexus injury.     Lex Bernard MD  Department of Neurology        Nerve Conduction Studies  Motor Sites      Latency Amplitude Neg. Amp Diff Distance Velocity Neg. Dur Neg Area Diff Temperature Comment   Site (ms) Norm (mV) Norm % cm m/s Norm ms %  C    Left Median (APB) Motor   Wrist 3.6  < 4.4 5.0  > 5.0  8   4.8  32.1    Elbow 7.5 - 5.2 - 4.0 20 51  > 48 4.7 3.7 32    Right Median (APB) Motor   Wrist 3.6  < 4.4 6.9  > 5.0  8   4.6  32.1    Elbow 7.2 - 6.9 - 0 21 58  > 48 4.4 -2.2 32    Left Ulnar (ADM) Motor   Wrist 2.8  < 3.5 6.3  > 5.0  8   6.3  30.9    Bel Elbow 5.5 - 5.5 - -12.7 15.5 57  > 48 6.1 -9.4 30.5    Abv Elbow 7.1 - 5.3 - -3.6 7 44  > 48 - - 29.9    Up Arm 8.5 - 4.9 -     6.3  30.1 10   Right Ulnar (ADM) Motor   Wrist 3.0  < 3.5 5.4  > 5.0  8   6.1  31.5    Bel Elbow 6.6 - 4.9 - -9.3 19 53  > 48 5.7 -11.5 31.5    Abv Elbow 8.4 - 4.8 - -2.0 8 44  > 48 5.7 -1.78 31.5    Up Arm 10.0 - 4.7 -     5.7  31.4 10   Left Ulnar (FDI) Motor   Wrist 4.9 - 6.5 -     3.9  32    Bel Elbow 7.6 - 5.4 - -16.9 15.5 57  > 48 4.0 -15.2 31.8    Abv Elbow 8.9 - 5.2 - -3.7 6.5 50  > 48 4.4 0.94 31.5    Erb's Pt. 10.4 - 4.3 - -17.3 8.5 57 - 4.7 -14.0 31.3    Right Ulnar (FDI) Motor   Wrist 4.5 - 5.0 -     4.5  31.5    Bel Elbow 7.9 - 4.3 - -14.0 18 53  > 48 4.6 -8.6 31.5    Abv Elbow 10.0 - 3.8 - -11.6 9 43  > 48 4.5 -12.8 31.4    Erb's Pt. 11.8 - 3.9 - 2.6 10 56 - 4.6 -0.98 31.4      Sensory Sites      Onset Lat Peak Lat Amp (O-P) Amp (P-P) Segment Distance Velocity Temperature   Site ms ms  V Norm  V  cm m/s Norm  C   Left Lateral Antebrachial Cutaneous Sensory   Lat  Biceps-Lat Forearm 1.35 1.70 10 - 11 Lat Biceps-Lat Forearm 9 67 - 29.4   Right Lateral Antebrachial Cutaneous Sensory   Lat Biceps-Lat Forearm 1.38 1.95 18 - 17 Lat Biceps-Lat Forearm 9 65 - 32.6   Left Medial Antebrachial Cutaneous Sensory   Elbow-Med Forearm NR NR NR - NR Elbow-Med Forearm - NR - 24.9   Right Medial Antebrachial Cutaneous Sensory   Elbow-Med Forearm 1.50 2.0 9 - 9 Elbow-Med Forearm 9 60 - 32.6   Left Median Sensory   Wrist-Dig II 2.6 3.5 39  > 10 46 Wrist-Dig II 14 54  > 48 31.1   Right Median Sensory   Wrist-Dig II 2.7 3.5 29  > 10 48 Wrist-Dig II 14 52  > 48 31.2   Right Radial Sensory   Forearm-Wrist 1.70 2.3 35  > 15 48 Forearm-Wrist 10 59 - 32.7   Left Ulnar Sensory   Wrist-Dig V 2.5 3.3 29  > 8 28 Wrist-Dig V 12.5 50  > 48 24.8   Right Ulnar Sensory   Wrist-Dig V 2.5 3.2 16  > 8 33 Wrist-Dig V 12.5 50  > 48 32.1     Inter-Nerve Comparisons     Nerve 1 Value 1 Nerve 2 Value 2 Parameter Result Normal   Sensory Sites   R Median Palm-Wrist 2.0 ms R Ulnar Palm-Wrist 2.1 ms Peak Lat Diff 0.12 ms <0.30   L Median Palm-Wrist 1.7 ms L Ulnar Palm-Wrist 1.9 ms Peak Lat Diff 0.20 ms <0.30     F Wave Studies     Min-F Max-F Dispersion Persistence Mean-F F-Norm L-R Mean-F L-R Mean-F Norm F/M Ratio F-M Lat (ms)   Right Median (Abd Poll Brev)  31.7  C   25.16 25.70 0.54 83.33 25.50 <33  <2.2 3.02 22.11   Right Ulnar (Abd Dig Min)  32.6  C   30.08 30.63 0.55 30.00 30.36 <36  <2.5 1.80 25.31       Electromyography     Side Muscle Ins Act Fibs/PSW Fasc HF Amp Dur Poly Recrt Int Pat   Left Deltoid Nml None Nml 0 Nml Nml 0 Nml Nml   Left Biceps Nml None Nml 0 Nml Nml 0 Nml Nml   Left Triceps Nml None Nml 0 Nml Nml 0 Nml Nml   Left Pronator Teres Nml None Nml 0 Nml Nml 0 Nml Nml   Left EIP Nml None Nml 0 Nml Nml 0 Nml Nml   Left FDI Nml None Nml 0 Nml Nml 2+ Nml Nml   Left FPL Nml None Nml 0 Nml Nml 0 Nml Nml   Right Deltoid Nml None Nml 0 Nml Nml 0 Nml Nml   Right Biceps Nml None Nml 0 Nml Nml 0 Nml Nml   Right  Triceps Nml None Nml 0 Nml Nml 0 Nml Nml   Right Pronator Teres Incr 2+ Nml 0 2+ Nml 2+ Nml Nml   Right FDI Incr 2+ Nml 0 2+ 2+ 0 Nml Nml   Right FPL Nml None Nml 0 Nml Nml 0 Nml Nml   Right EIP Nml None Nml 0 Nml Nml 0 Nml Nml         NCS Waveforms:    Motor                           Sensory

## 2023-03-15 PROBLEM — M79.641 BILATERAL HAND PAIN: Status: RESOLVED | Noted: 2022-12-12 | Resolved: 2023-03-15

## 2023-03-15 PROBLEM — M79.642 BILATERAL HAND PAIN: Status: RESOLVED | Noted: 2022-12-12 | Resolved: 2023-03-15

## 2023-03-15 PROBLEM — G56.92 NEUROPATHY OF LEFT HAND: Status: RESOLVED | Noted: 2022-12-12 | Resolved: 2023-03-15

## 2023-03-15 PROBLEM — G56.91 NEUROPATHY OF RIGHT HAND: Status: RESOLVED | Noted: 2022-12-12 | Resolved: 2023-03-15

## 2023-04-23 ENCOUNTER — HEALTH MAINTENANCE LETTER (OUTPATIENT)
Age: 63
End: 2023-04-23

## 2023-05-17 ENCOUNTER — OFFICE VISIT (OUTPATIENT)
Dept: NEUROLOGY | Facility: CLINIC | Age: 63
End: 2023-05-17
Payer: COMMERCIAL

## 2023-05-17 ENCOUNTER — LAB (OUTPATIENT)
Dept: LAB | Facility: CLINIC | Age: 63
End: 2023-05-17
Payer: COMMERCIAL

## 2023-05-17 VITALS
OXYGEN SATURATION: 100 % | BODY MASS INDEX: 20.26 KG/M2 | WEIGHT: 107.2 LBS | HEART RATE: 67 BPM | SYSTOLIC BLOOD PRESSURE: 166 MMHG | DIASTOLIC BLOOD PRESSURE: 86 MMHG

## 2023-05-17 DIAGNOSIS — G54.0 BRACHIAL PLEXOPATHY: ICD-10-CM

## 2023-05-17 DIAGNOSIS — G54.0 BRACHIAL PLEXOPATHY: Primary | ICD-10-CM

## 2023-05-17 LAB
CRP SERPL-MCNC: <3 MG/L
RHEUMATOID FACT SER NEPH-ACNC: 17 IU/ML

## 2023-05-17 PROCEDURE — 86036 ANCA SCREEN EACH ANTIBODY: CPT | Mod: 90 | Performed by: PATHOLOGY

## 2023-05-17 PROCEDURE — 99215 OFFICE O/P EST HI 40 MIN: CPT | Performed by: PSYCHIATRY & NEUROLOGY

## 2023-05-17 PROCEDURE — 36415 COLL VENOUS BLD VENIPUNCTURE: CPT | Performed by: PATHOLOGY

## 2023-05-17 PROCEDURE — 86037 ANCA TITER EACH ANTIBODY: CPT | Mod: 90 | Performed by: PATHOLOGY

## 2023-05-17 PROCEDURE — 86235 NUCLEAR ANTIGEN ANTIBODY: CPT | Mod: 90 | Performed by: PATHOLOGY

## 2023-05-17 PROCEDURE — 86431 RHEUMATOID FACTOR QUANT: CPT | Mod: 90 | Performed by: PATHOLOGY

## 2023-05-17 PROCEDURE — 86038 ANTINUCLEAR ANTIBODIES: CPT | Mod: 90 | Performed by: PATHOLOGY

## 2023-05-17 PROCEDURE — 86334 IMMUNOFIX E-PHORESIS SERUM: CPT

## 2023-05-17 PROCEDURE — 86140 C-REACTIVE PROTEIN: CPT | Performed by: PATHOLOGY

## 2023-05-17 PROCEDURE — 83516 IMMUNOASSAY NONANTIBODY: CPT | Mod: 90 | Performed by: PATHOLOGY

## 2023-05-17 PROCEDURE — 99000 SPECIMEN HANDLING OFFICE-LAB: CPT | Performed by: PATHOLOGY

## 2023-05-17 RX ORDER — DIAZEPAM 2 MG
2 TABLET ORAL SEE ADMIN INSTRUCTIONS
Qty: 3 TABLET | Refills: 0 | Status: SHIPPED | OUTPATIENT
Start: 2023-05-17

## 2023-05-17 ASSESSMENT — PAIN SCALES - GENERAL: PAINLEVEL: SEVERE PAIN (6)

## 2023-05-17 NOTE — PROGRESS NOTES
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 As part of this procedure apparently a probe is applied to the axilla and sends ultrasound into the skin. It is designed to smooth skin through collagen stimulation. The procedure lasted 30 min on the left and 45 minutes on the right. During the procedure she experienced electrical shocks into her elbows, forearm and fingers. Immediately after the procedure she hand numbness and burning in her 4th and 5th digits as well as her medial forearms on both sides. The right side was more affected than the left. In addition to numbness and pain she felt weaker in her right hand. Pain initially was very severe.  The electric burning pain improved over time. Numbness persisted in the medial fingers and forearm. Three EMGs have been performed as well as an MRI of her right elbow and brachial plexus, detailed below. One study 12/21 reported ulnar neuropathy across the elbow, which lead to ulnar nerve decompression on the right. This did not help her symptoms.     Interval history:   I last saw her 11/23/22. After that visit NCS/EMG was performed. Found were bilateral ulnar neuropathies across the elbows. It was also noted that the left MAC was absent and there was denervation in the right PT muscle. Overall her symptoms are largely stable, although she has noticed more atrophy of right hand and forearm muscles over the last few months. No better or worse. She continues to have numbness and paresthesias in the 4th and 5th digits and medial forearm on the right. On the left the affected area is digits 3 and 4 along with the medial forearm.  She feels weak in both hands. Her left hand is stronger than the right. Functionally she struggles with fine finger activities in both hands.     Prior pertinent radiology work-up:  1/14/22: MRI right elbow showed small  effusion at elbow. No nerve impingement.   1/14/22: MRI right brachial plexus with and without contrast showed no abnormalities in the plexus    Prior electrophysiologic work-up:  10/19/21: Nerve conduction studies/EMG performed at [facility]y. On the report it was interpreted as a right and left medial cord plexopathy and an ulnar neuropathy. Data from the study is somewhat hard to interpret, but reported was an absent left MAC, reduced right KATHARINA, reduced left ulnar-D5 snap, small right ulnar-ADM motor with possible slowing across the elbow. The other motor and sensory responses were normal.   12/21/21: NCS/EMG at [facility]. I do not have the data from that study. A description of the findings states that the bilateral LAC and MACs were normal. The left ulnar and bilateral median snaps normal. Right ulnar snap reduced amplitude. Median motor both normal. Right ulnar showed reduced amplitued and CV slowing across elbow. Left ulnar motor normal.   6/23/22: NCS/EMG at [facility]. Just the right side studies. The right ulnar motor and sensory responses are reported as normal. EMG findings are harder to interpret based on the report, but some chronic reinnervation changes were reported in FDI, FCU, ADM, APB and triceps.   2/1/23: NCS EMG showed bilateral ulnar neuropathies across the elbows. Counseled her on conservative management of ulnar neuropathies. Also noted absent left MAC and denervation in right PT muscle. Right PT denervation is difficult to anatomically place. 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     Past Medical History:   Past Medical History:   Diagnosis Date     DDD (degenerative disc disease), cervical 10 years ago     Raynaud disease 2003     Past Surgical History:  Past Surgical History:   Procedure Laterality Date     HC SUCT EMANUEL LIPECTOMY,LOW EXTREM  07/13/99      SUCT EMANUEL LIPECTOMY,TRUNK  02/2003     IR LUMBAR EPIDURAL STEROID INJECTION  6/7/2002     LAMINECTOMY LUMBAR MINIMALLY  INVASIVE ONE LEVEL  6/2002     RECONSTRUCT BREAST, IMPLANT PROSTHESIS, COMBINED  11/1997     Family history:    There is no known family history of hereditary neuropathies or other neuromuscular disorders.    Social History:    She denies tobacco, alcohol, or illicit drug use.     Medical Allergies:    Allergies   Allergen Reactions     Pcn [Penicillins] Hives     Current Medications:    Current Outpatient Medications   Medication     Calcium Carbonate-Vitamin D (CALCIUM + D PO)     Misc Natural Products ([employer] GLUCOS-CHONDROIT-MSM TS PO)     Misc Natural Products (FIBER 7) POWD     omega-3 fatty acids 1200 MG capsule     vitamin C with B complex (B COMPLEX-C) tablet     No current facility-administered medications for this visit.     Review of Systems: A complete review of systems was obtained and was negative except for what was noted above.    Physical examination:    BP (!) 166/86 (BP Location: Right arm, Patient Position: Sitting, Cuff Size: Adult Regular)   Pulse 67   Wt 48.6 kg (107 lb 3.2 oz)   LMP 05/15/2012   SpO2 100%   BMI 20.26 kg/m       General Appearance: NAD    Neurologic examination:    Mental status:  Patient is alert, attentive, and oriented x 3.  Language is coherent and fluent without  aphasia.  Memory, comprehension and ability to follow commands were intact.       Cranial nerves:   Pupils were round and reacted to light.  Extraocular movements full. No face, jaw, palate or tongue weakness or atrophy. Hearing was grossly intact.      Motor exam:  There is atrophy of FDI on both sides, right > left. APB is atrophic on the right > left as well. There is also atrophy of distal forearm extensor and anterior forearm muscles on the right. Manual muscle testing revealed the following MRC grade muscle power:   Right Left   Neck flexion 5    Neck extension: 5    Shoulder abduction:  5 5   Elbow extension: 5 5   Elbow flexion:  5 5   Wrist flexion:  5 5   Wrist flexion: 5 5   Wrist pronation 4 5    Finger extension:  4 5   APB 4 4   FDI 4- 4   Hip Flexion 5 5   Knee extension 5 5   Dorsiflexion 5 5   Plantar flexion 5 5   Red indicates worse when compared to last examination  Green indicates improved when compared to last examination    Complex motor skills: No tremor or ataxia    Sensory exam: Pin patchy in her fingers on both sides. Medial hand and forearm more dense. Vibration intact.     Gait: Narrow and stable.      Deep tendon reflexes:   Right Left   Triceps 2 2   Biceps 2 2   Brachioradialis 2 2   Knee jerk 2 2   Ankle jerk 2 2      Assessment and plan:    Brii Rainey is a 62 year old woman who developed brachial plexopathies (medial cord predominant) after a microfocused ultrasound procedure called ultherapy was administrated to her axilla. It has been about 20 months since onset. It is somewhat disconcerting that there is more weakness and atrophy on examination today, and when we performed the EMG in February there were denervation changes in the PT muscle. I am going to repeat the MRI of her C spine and brachial plexus. I am also going to obtain laboratories for inflammatory markers (ANCA, FITZ, ESR, CRP, RF, serum IF, GM1). An active inflammatory brachial plexopathy is unlikely, but that is what we are looking for. We again discussed prognosis of nerve repair and regeneration.  In some cases recovery may not be complete, and considering she is almost 2 years out from the injury  meaningful recovery beyond this point is unlikely. I will also see her back in 6 months for neurologic surveillance.   ---  6/7/23: MRI C spine showed multilevel moderate to severe cervical spondylosis with multilevel moderate to severe neural foraminal stenosis. Moderate spinal canal stenosis at C4-5 and C5-6. No abnormal cervical cord signal.  No abnormal enhancement of the cervical spinal cord or nerves.     6/8/23: MRI brachial plexus showed mild edema within the right brachial plexus. No enhancement. Left brachial  plexus was normal. Considering that no enhancement I suspect the mild finding on the left is a sequela of the prior injury rather than an ongoing inflammatory or infiltrative condition - but to explore this more will obtain CSF. Also noted on the imaging was T2 hyperintense signal with associated enhancement within the T4-T6 vertebral bodies, T4 spinous process. These may represent benign process such as degenerative changes especially signal within the vertebral bodies. However, signal within T4 spinous process  is indeterminant. We will obtain a thoracic MR to explore this further, but she let me know that in 2/22 she had a NM bone scan whole body that showed no scintigraphic evidence of bony metastatic disease. I will be in touch with her about the LP and MRI results as they become available.    6/21/23: CSF RBC 0, WBC 0, protein 67. Hard to know what to make of that protein level. Only marginally elevated, but this + MRI plexus + clinical course may be enough to justify a IVIG treatment trial. Will proceed with MRI  T spine as above and discuss more when all data is back.     7/3/23: MRI T spine showed no abnormal enhancement in the thoracic spine. No significant spinal canal or neuroforaminal stenosis. Multiple benign vertebral hemangiomas. Will let her know. Considering benign appearance and previous work up will not pursue further at this time.     7/6/23: Discussed with her by phone. Discussed rational for IVIG and risks and unknown benefits. She reports that currently her symptoms are stable - not better but also not worsening. Considering onset at the time of 9/21 procedure it is unlikely that this is an active immune condition. We agree to hold off on IVIG for now. Will continue clinical survillience. If she does feel that symptoms are worsening then will reconsider IVIG.

## 2023-05-17 NOTE — LETTER
5/17/2023       RE: Brii Rainey  [address] [facility] [address]       Dear Colleague,    Thank you for referring your patient, Brii Rainey, to the [facility] [place] at [facility]. Please see a copy of my visit note below.    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 As part of this procedure apparently a probe is applied to the axilla and sends ultrasound into the skin. It is designed to smooth skin through collagen stimulation. The procedure lasted 30 min on the left and 45 minutes on the right. During the procedure she experienced electrical shocks into her elbows, forearm and fingers. Immediately after the procedure she hand numbness and burning in her 4th and 5th digits as well as her medial forearms on both sides. The right side was more affected than the left. In addition to numbness and pain she felt weaker in her right hand. Pain initially was very severe.  The electric burning pain improved over time. Numbness persisted in the medial fingers and forearm. Three EMGs have been performed as well as an MRI of her right elbow and brachial plexus, detailed below. One study 12/21 reported ulnar neuropathy across the elbow, which lead to ulnar nerve decompression on the right. This did not help her symptoms.     Interval history:   I last saw her 11/23/22. After that visit NCS/EMG was performed. Found were bilateral ulnar neuropathies across the elbows. It was also noted that the left MAC was absent and there was denervation in the right PT muscle. Overall her symptoms are largely stable, although she has noticed more atrophy of right hand and forearm muscles over the last few months. No better or worse. She continues to have numbness and paresthesias in the 4th and 5th digits and medial forearm on the  right. On the left the affected area is digits 3 and 4 along with the medial forearm.  She feels weak in both hands. Her left hand is stronger than the right. Functionally she struggles with fine finger activities in both hands.     Prior pertinent radiology work-up:  1/14/22: MRI right elbow showed small effusion at elbow. No nerve impingement.   1/14/22: MRI right brachial plexus with and without contrast showed no abnormalities in the plexus    Prior electrophysiologic work-up:  10/19/21: Nerve conduction studies/EMG performed at [facility] neurolog[facility]. On the report it was interpreted as a right and left medial cord plexopathy and an ulnar neuropathy. Data from the study is somewhat hard to interpret, but reported was an absent left MAC, reduced right KATHARINA, reduced left ulnar-D5 snap, small right ulnar-ADM motor with possible slowing across the elbow. The other motor and sensory responses were normal.   12/21/21: NCS/EMG at [facility]. I do not have the data from that study. A description of the findings states that the bilateral LAC and MACs were normal. The left ulnar and bilateral median snaps normal. Right ulnar snap reduced amplitude. Median motor both normal. Right ulnar showed reduced amplitued and CV slowing across elbow. Left ulnar motor normal.   6/23/22: NCS/EMG at [facility]. Just the right side studies. The right ulnar motor and sensory responses are reported as normal. EMG findings are harder to interpret based on the report, but some chronic reinnervation changes were reported in FDI, FCU, ADM, APB and triceps.   2/1/23: NCS EMG showed bilateral ulnar neuropathies across the elbows. Counseled her on conservative management of ulnar neuropathies. Also noted absent left MAC and denervation in right PT muscle. Right PT denervation is difficult to anatomically place. 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     Past Medical History:   Past Medical History:   Diagnosis Date    DDD (degenerative disc disease),  cervical 10 years ago    Raynaud disease 2003     Past Surgical History:  Past Surgical History:   Procedure Laterality Date    HC DARLENE EMANUEL LIPECTOMY,LOW EXTREM  07/13/99    HC SUCT EMANUEL LIPECTOMY,TRUNK  02/2003    IR LUMBAR EPIDURAL STEROID INJECTION  6/7/2002    LAMINECTOMY LUMBAR MINIMALLY INVASIVE ONE LEVEL  6/2002    RECONSTRUCT BREAST, IMPLANT PROSTHESIS, COMBINED  11/1997     Family history:    There is no known family history of hereditary neuropathies or other neuromuscular disorders.    Social History:    She denies tobacco, alcohol, or illicit drug use.     Medical Allergies:    Allergies   Allergen Reactions    Pcn [Penicillins] Hives     Current Medications:    Current Outpatient Medications   Medication    Calcium Carbonate-Vitamin D (CALCIUM + D PO)    Misc Natural Products ([employer] GLUCOS-CHONDROIT-MSM TS PO)    Misc Natural Products (FIBER 7) POWD    omega-3 fatty acids 1200 MG capsule    vitamin C with B complex (B COMPLEX-C) tablet     No current facility-administered medications for this visit.     Review of Systems: A complete review of systems was obtained and was negative except for what was noted above.    Physical examination:    BP (!) 166/86 (BP Location: Right arm, Patient Position: Sitting, Cuff Size: Adult Regular)   Pulse 67   Wt 48.6 kg (107 lb 3.2 oz)   LMP 05/15/2012   SpO2 100%   BMI 20.26 kg/m       General Appearance: NAD    Neurologic examination:    Mental status:  Patient is alert, attentive, and oriented x 3.  Language is coherent and fluent without  aphasia.  Memory, comprehension and ability to follow commands were intact.       Cranial nerves:   Pupils were round and reacted to light.  Extraocular movements full. No face, jaw, palate or tongue weakness or atrophy. Hearing was grossly intact.      Motor exam:  There is atrophy of FDI on both sides, right > left. APB is atrophic on the right > left as well. There is also atrophy of distal forearm extensor and anterior  forearm muscles on the right. Manual muscle testing revealed the following MRC grade muscle power:   Right Left   Neck flexion 5    Neck extension: 5    Shoulder abduction:  5 5   Elbow extension: 5 5   Elbow flexion:  5 5   Wrist flexion:  5 5   Wrist flexion: 5 5   Wrist pronation 4 5   Finger extension:  4 5   APB 4 4   FDI 4- 4   Hip Flexion 5 5   Knee extension 5 5   Dorsiflexion 5 5   Plantar flexion 5 5   Red indicates worse when compared to last examination  Green indicates improved when compared to last examination    Complex motor skills: No tremor or ataxia    Sensory exam: Pin patchy in her fingers on both sides. Medial hand and forearm more dense. Vibration intact.     Gait: Narrow and stable.      Deep tendon reflexes:   Right Left   Triceps 2 2   Biceps 2 2   Brachioradialis 2 2   Knee jerk 2 2   Ankle jerk 2 2      Assessment and plan:    Brii Rainey is a 62 year old woman who developed brachial plexopathies (medial cord predominant) after a microfocused ultrasound procedure called ultherapy was administrated to her axilla. It has been about 20 months since onset. It is somewhat disconcerting that there is more weakness and atrophy on examination today, and when we performed the EMG in February there were denervation changes in the PT muscle. I am going to repeat the MRI of her C spine and brachial plexus. I am also going to obtain laboratories for inflammatory markers (ANCA, FITZ, ESR, CRP, RF, serum IF, GM1). An active inflammatory brachial plexopathy is unlikely, but that is what we are looking for. We again discussed prognosis of nerve repair and regeneration.  In some cases recovery may not be complete, and considering she is almost 2 years out from the injury  meaningful recovery beyond this point is unlikely. I will also see her back in 6 months for neurologic surveillance.   ---        Again, thank you for allowing me to participate in the care of your patient.      Sincerely,    Lex PEREYRA  MD Marco Antonio

## 2023-05-18 LAB
ANA SER QL IF: NEGATIVE
ANCA AB PATTERN SER IF-IMP: NORMAL
C-ANCA TITR SER IF: NORMAL {TITER}

## 2023-05-19 LAB
ENA SS-A AB SER IA-ACNC: <0.5 U/ML
ENA SS-A AB SER IA-ACNC: NEGATIVE
ENA SS-B IGG SER IA-ACNC: <0.6 U/ML
ENA SS-B IGG SER IA-ACNC: NEGATIVE
PROT PATTERN SERPL IFE-IMP: NORMAL

## 2023-05-20 LAB
GM1 GANGL IGG SER IA-ACNC: 5 IV
GM1 GANGL IGM SER IA-ACNC: 8 IV

## 2023-06-06 ENCOUNTER — ANCILLARY PROCEDURE (OUTPATIENT)
Dept: MRI IMAGING | Facility: CLINIC | Age: 63
End: 2023-06-06
Attending: PSYCHIATRY & NEUROLOGY
Payer: COMMERCIAL

## 2023-06-06 DIAGNOSIS — G54.0 BRACHIAL PLEXOPATHY: ICD-10-CM

## 2023-06-06 PROCEDURE — 72156 MRI NECK SPINE W/O & W/DYE: CPT | Mod: GC | Performed by: RADIOLOGY

## 2023-06-06 PROCEDURE — A9585 GADOBUTROL INJECTION: HCPCS | Performed by: RADIOLOGY

## 2023-06-06 RX ORDER — GADOBUTROL 604.72 MG/ML
7.5 INJECTION INTRAVENOUS ONCE
Status: COMPLETED | OUTPATIENT
Start: 2023-06-06 | End: 2023-06-06

## 2023-06-06 RX ADMIN — GADOBUTROL 5 ML: 604.72 INJECTION INTRAVENOUS at 18:32

## 2023-06-08 ENCOUNTER — ANCILLARY PROCEDURE (OUTPATIENT)
Dept: MRI IMAGING | Facility: CLINIC | Age: 63
End: 2023-06-08
Attending: PSYCHIATRY & NEUROLOGY
Payer: COMMERCIAL

## 2023-06-08 DIAGNOSIS — G54.0 BRACHIAL PLEXOPATHY: ICD-10-CM

## 2023-06-08 PROCEDURE — A9585 GADOBUTROL INJECTION: HCPCS | Performed by: RADIOLOGY

## 2023-06-08 PROCEDURE — 99207 MR BRACHIAL PLEXUS RIGHT W/O & W CONTRAST: CPT | Performed by: RADIOLOGY

## 2023-06-08 PROCEDURE — 72156 MRI NECK SPINE W/O & W/DYE: CPT | Performed by: RADIOLOGY

## 2023-06-08 RX ORDER — GADOBUTROL 604.72 MG/ML
7.5 INJECTION INTRAVENOUS ONCE
Status: COMPLETED | OUTPATIENT
Start: 2023-06-08 | End: 2023-06-08

## 2023-06-08 RX ADMIN — GADOBUTROL 5 ML: 604.72 INJECTION INTRAVENOUS at 09:47

## 2023-06-08 NOTE — DISCHARGE INSTRUCTIONS
MRI Contrast Discharge Instructions    The IV contrast you received today will pass out of your body in your  urine. This will happen in the next 24 hours. You will not feel this process.  Your urine will not change color.    Drink at least 4 extra glasses of water or juice today (unless your doctor  has restricted your fluids). This reduces the stress on your kidneys.  You may take your regular medicines.    If you are on dialysis: It is best to have dialysis today.    If you have a reaction: Most reactions happen right away. If you have  any new symptoms after leaving the hospital (such as hives or swelling),  call your hospital at the correct number below. Or call your family doctor.  If you have breathing distress or wheezing, call 911.    Special instructions: ***    I have read and understand the above information.    Signature:______________________________________ Date:___________    Staff:__________________________________________ Date:___________     Time:__________    [facility] Departments:    ___[place]: [phone]  ___[facility]: [phone]  ___[place]: [phone] ___[facility]: [phone]  ___[facility]: [phone]  ___[facility]: [phone]  ___[phone]  ___[facility]: [phone]  ___[phone]

## 2023-06-08 NOTE — DISCHARGE INSTRUCTIONS
MRI Contrast Discharge Instructions    The IV contrast you received today will pass out of your body in your  urine. This will happen in the next 24 hours. You will not feel this process.  Your urine will not change color.    Drink at least 4 extra glasses of water or juice today (unless your doctor  has restricted your fluids). This reduces the stress on your kidneys.  You may take your regular medicines.    If you are on dialysis: It is best to have dialysis today.    If you have a reaction: Most reactions happen right away. If you have  any new symptoms after leaving the hospital (such as hives or swelling),  call your hospital at the correct number below. Or call your family doctor.  If you have breathing distress or wheezing, call 911.    Special instructions: ***    I have read and understand the above information.    Signature:______________________________________ Date:___________    Staff:__________________________________________ Date:___________     Time:__________    [facility] Departments:    ___[place]: [phone]  ___[facility]: [phone]  ___[place]: [phone] ___[facility]: [phone]  ___[facility]: [phone]  ___[facility]: [phone]  ___[phone]  ___[facility]: [phone]  ___[phone]

## 2023-06-09 ENCOUNTER — TELEPHONE (OUTPATIENT)
Dept: NEUROLOGY | Facility: CLINIC | Age: 63
End: 2023-06-09
Payer: COMMERCIAL

## 2023-06-09 NOTE — TELEPHONE ENCOUNTER
LVM for pt to assist pt to schedule her MRI and Lumbar Puncture ordered by Dr Bernard.     Thank You.       6/9/23 BD  
independent

## 2023-06-12 DIAGNOSIS — G62.9 NEUROPATHY: Primary | ICD-10-CM

## 2023-06-21 ENCOUNTER — MYC MEDICAL ADVICE (OUTPATIENT)
Dept: NEUROLOGY | Facility: CLINIC | Age: 63
End: 2023-06-21

## 2023-06-21 ENCOUNTER — ANCILLARY PROCEDURE (OUTPATIENT)
Dept: INTERVENTIONAL RADIOLOGY/VASCULAR | Facility: CLINIC | Age: 63
End: 2023-06-21
Attending: PSYCHIATRY & NEUROLOGY
Payer: COMMERCIAL

## 2023-06-21 ENCOUNTER — LAB (OUTPATIENT)
Dept: LAB | Facility: CLINIC | Age: 63
End: 2023-06-21
Payer: COMMERCIAL

## 2023-06-21 VITALS
TEMPERATURE: 98.4 F | OXYGEN SATURATION: 99 % | SYSTOLIC BLOOD PRESSURE: 144 MMHG | DIASTOLIC BLOOD PRESSURE: 77 MMHG | HEART RATE: 60 BPM

## 2023-06-21 DIAGNOSIS — G54.0 BRACHIAL PLEXOPATHY: ICD-10-CM

## 2023-06-21 DIAGNOSIS — G62.9 NEUROPATHY: ICD-10-CM

## 2023-06-21 DIAGNOSIS — G54.0 BRACHIAL PLEXOPATHY: Primary | ICD-10-CM

## 2023-06-21 LAB
APPEARANCE CSF: CLEAR
COLOR CSF: COLORLESS
ERYTHROCYTE [DISTWIDTH] IN BLOOD BY AUTOMATED COUNT: 13.2 % (ref 10–15)
GLUCOSE CSF-MCNC: 53 MG/DL (ref 40–70)
HCT VFR BLD AUTO: 38.5 % (ref 35–47)
HGB BLD-MCNC: 12.9 G/DL (ref 11.7–15.7)
HOLD SPECIMEN: NORMAL
INR PPP: 0.97 (ref 0.85–1.15)
MCH RBC QN AUTO: 31.4 PG (ref 26.5–33)
MCHC RBC AUTO-ENTMCNC: 33.5 G/DL (ref 31.5–36.5)
MCV RBC AUTO: 94 FL (ref 78–100)
PATH REPORT.COMMENTS IMP SPEC: NORMAL
PATH REPORT.COMMENTS IMP SPEC: NORMAL
PATH REPORT.FINAL DX SPEC: NORMAL
PATH REPORT.GROSS SPEC: NORMAL
PATH REPORT.MICROSCOPIC SPEC OTHER STN: NORMAL
PATH REPORT.RELEVANT HX SPEC: NORMAL
PLATELET # BLD AUTO: 328 10E3/UL (ref 150–450)
PROT CSF-MCNC: 67.4 MG/DL (ref 15–45)
RBC # BLD AUTO: 4.11 10E6/UL (ref 3.8–5.2)
RBC # CSF MANUAL: 0 /UL (ref 0–2)
TUBE # CSF: 3
WBC # BLD AUTO: 5.5 10E3/UL (ref 4–11)
WBC # CSF MANUAL: 0 /UL (ref 0–5)

## 2023-06-21 PROCEDURE — 85027 COMPLETE CBC AUTOMATED: CPT | Performed by: PATHOLOGY

## 2023-06-21 PROCEDURE — 88108 CYTOPATH CONCENTRATE TECH: CPT | Mod: TC | Performed by: PSYCHIATRY & NEUROLOGY

## 2023-06-21 PROCEDURE — 84157 ASSAY OF PROTEIN OTHER: CPT | Performed by: PSYCHIATRY & NEUROLOGY

## 2023-06-21 PROCEDURE — 36415 COLL VENOUS BLD VENIPUNCTURE: CPT | Performed by: PATHOLOGY

## 2023-06-21 PROCEDURE — 85610 PROTHROMBIN TIME: CPT | Performed by: PATHOLOGY

## 2023-06-21 PROCEDURE — 82945 GLUCOSE OTHER FLUID: CPT | Performed by: PSYCHIATRY & NEUROLOGY

## 2023-06-21 PROCEDURE — 89050 BODY FLUID CELL COUNT: CPT | Performed by: PSYCHIATRY & NEUROLOGY

## 2023-06-21 PROCEDURE — 88108 CYTOPATH CONCENTRATE TECH: CPT | Mod: 26 | Performed by: PATHOLOGY

## 2023-06-21 PROCEDURE — 62328 DX LMBR SPI PNXR W/FLUOR/CT: CPT | Performed by: PHYSICIAN ASSISTANT

## 2023-06-21 PROCEDURE — 99000 SPECIMEN HANDLING OFFICE-LAB: CPT | Performed by: PATHOLOGY

## 2023-06-21 RX ORDER — LIDOCAINE HYDROCHLORIDE 10 MG/ML
5 INJECTION, SOLUTION EPIDURAL; INFILTRATION; INTRACAUDAL; PERINEURAL ONCE
Status: COMPLETED | OUTPATIENT
Start: 2023-06-21 | End: 2023-06-21

## 2023-06-21 RX ADMIN — LIDOCAINE HYDROCHLORIDE 10 ML: 10 INJECTION, SOLUTION EPIDURAL; INFILTRATION; INTRACAUDAL; PERINEURAL at 12:15

## 2023-06-21 NOTE — PROGRESS NOTES
Interventional Radiology Brief Post Procedure Note    Procedure: IR Fluoro Guided Lumbar Puncture    Diagnosis:    Encounter Diagnoses   Name Primary?     Neuropathy      Brachial plexopathy      Proceduralist: Claudia Yu PA-C    Time Out: Prior to the start of the procedure and with procedural staff participation, I verbally confirmed the patient s identity using two indicators, relevant allergies, that the procedure was appropriate and matched the consent or emergent situation, and that the correct equipment/implants were available. Immediately prior to starting the procedure I conducted the Time Out with the procedural staff and re-confirmed the patient s name, procedure, and site/side. (The Joint Commission universal protocol was followed.)  Yes    Sedation: None. Local Anesthestic used    Findings: Completed L5-S1 lumbar puncture with removal of 9.5 ml of clear fluid.      Estimated Blood Loss: Minimal    SPECIMENS: CSF Fluid for laboratory analysis as ordered by referring provider.      Complications: 1. None     Condition: Stable    Plan: Bed rest for 1 hour.      Comments: See dictated procedure note for full details.    Claudia Yu PA-C

## 2023-06-21 NOTE — DISCHARGE INSTRUCTIONS
Discharge instructions for Lumbar Puncture           AFTER YOU GO HOME      Relax and take it easy for 24 hours  You may resume normal activity after the 24 hour period  You may develop a headache that will normally go away on its own in 1 to 2 days. Lying down should help relieve this pain.  If you develop a headache you can take over the counter medicines and lay down.  You can try drinking caffeine-coffee, soda.   Drink at least 4 glasses of extra fluid today if not on a fluid restriction  Continue to take your medications as ordered by your provider  You may remove the bandage and resume bathing the next day  DO NOT drive or operate machinery at home or at work for at least 24 hours               CALL YOU PRIMARY PHYSICIAN IF:    Severe head or neck pain that doesn't go away or that gets worse  You feel less alert or have trouble waking up  Repeated upset stomach (nausea) or vomiting  Swelling, pain, bruising, or redness at the puncture site  Fever of 100.4 F (38 C) or higher, or as advised by your provider  If you start to leak a large amount of fluid from the puncture site (also, lie down flat on your back)      Date: 6/21/23  Provider: Claudia Yu PA-C, Interventional Radiology, [facility]    [facility]  [address] [place] [address]

## 2023-06-21 NOTE — PROGRESS NOTES
Patient tolerated procedure well. No obvious complications. VSS. AVS given and pt escorted to the waiting room with a family member.

## 2023-07-03 ENCOUNTER — ANCILLARY PROCEDURE (OUTPATIENT)
Dept: MRI IMAGING | Facility: CLINIC | Age: 63
End: 2023-07-03
Attending: PSYCHIATRY & NEUROLOGY
Payer: COMMERCIAL

## 2023-07-03 DIAGNOSIS — G54.0 BRACHIAL PLEXOPATHY: ICD-10-CM

## 2023-07-03 PROCEDURE — 72157 MRI CHEST SPINE W/O & W/DYE: CPT | Mod: GC | Performed by: RADIOLOGY

## 2023-07-03 PROCEDURE — A9585 GADOBUTROL INJECTION: HCPCS | Mod: JZ | Performed by: RADIOLOGY

## 2023-07-03 RX ORDER — GADOBUTROL 604.72 MG/ML
7.5 INJECTION INTRAVENOUS ONCE
Status: COMPLETED | OUTPATIENT
Start: 2023-07-03 | End: 2023-07-03

## 2023-07-03 RX ADMIN — GADOBUTROL 5 ML: 604.72 INJECTION INTRAVENOUS at 17:49

## 2023-07-05 ENCOUNTER — MYC MEDICAL ADVICE (OUTPATIENT)
Dept: NEUROLOGY | Facility: CLINIC | Age: 63
End: 2023-07-05
Payer: COMMERCIAL

## 2023-09-24 ENCOUNTER — HEALTH MAINTENANCE LETTER (OUTPATIENT)
Age: 63
End: 2023-09-24

## 2023-11-27 ENCOUNTER — OFFICE VISIT (OUTPATIENT)
Dept: NEUROLOGY | Facility: CLINIC | Age: 63
End: 2023-11-27
Payer: COMMERCIAL

## 2023-11-27 VITALS
HEART RATE: 69 BPM | OXYGEN SATURATION: 98 % | SYSTOLIC BLOOD PRESSURE: 179 MMHG | WEIGHT: 106.1 LBS | BODY MASS INDEX: 20.05 KG/M2 | DIASTOLIC BLOOD PRESSURE: 95 MMHG | RESPIRATION RATE: 20 BRPM

## 2023-11-27 DIAGNOSIS — G54.0 BRACHIAL PLEXOPATHY: Primary | ICD-10-CM

## 2023-11-27 PROCEDURE — 99214 OFFICE O/P EST MOD 30 MIN: CPT | Performed by: PSYCHIATRY & NEUROLOGY

## 2023-11-27 RX ORDER — ASCORBIC ACID 500 MG
500 TABLET ORAL DAILY
COMMUNITY

## 2023-11-27 RX ORDER — CHOLECALCIFEROL (VITAMIN D3) 10 MCG (400 UNIT) TABLET
10 DAILY
COMMUNITY

## 2023-11-27 ASSESSMENT — PAIN SCALES - GENERAL: PAINLEVEL: SEVERE PAIN (7)

## 2023-11-27 NOTE — NURSING NOTE
Chief Complaint   Patient presents with    RECHECK     BP (!) 179/95 (BP Location: Left arm, Patient Position: Sitting, Cuff Size: Adult Regular)   Pulse 69   Resp 20   Wt 48.1 kg (106 lb 1.6 oz)   LMP 05/15/2012   SpO2 98%   BMI 20.05 kg/m      KAROLYN SCOTT     Statement Selected
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
## 2023-11-27 NOTE — PROGRESS NOTES
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 As part of this procedure apparently a probe is applied to the axilla and sends ultrasound into the skin. It is designed to smooth skin through collagen stimulation. The procedure lasted 30 min on the left and 45 minutes on the right. During the procedure she experienced electrical shocks into her elbows, forearm and fingers. Immediately after the procedure she hand numbness and burning in her 4th and 5th digits as well as her medial forearms on both sides. The right side was more affected than the left. In addition to numbness and pain she felt weaker in her right hand. Pain initially was very severe.  The electric burning pain improved over time. Numbness persisted in the medial fingers and forearm. Three EMGs have been performed as well as an MRI of her right elbow and brachial plexus, detailed below. One study 12/21 reported ulnar neuropathy across the elbow, which lead to ulnar nerve decompression on the right. This did not help her symptoms. In 2022 and 2023 she noted an increase in atrophy of right hand and forearm muscles, but function stable.     Interval history:   I last saw her 5/17/23. After that visit we embarked on an extensive work up to better characterize her deficits. Out concern at the time was that she was experiencing an increase in atrophy in her arm muscles, which would not fit with a monophasic injury in 2021. We obtained an MRI C spine that showed multilevel degenerative changes as detailed below, but no  abnormal enhancement of the cervical spinal cord or nerves. MRI brachial plexus showed mild edema without enhancement within the right brachial plexus. Left brachial plexus was normal. Considering that no enhancement I suspected that the mild finding on the left is a sequela of the prior injury rather than an ongoing  inflammatory or infiltrative condition - but to explore this more will CSF was obtained. That study showed a normal cell count and a protein of 67. We considered the possibility that the mild edema + mild CSF protein elevation was suggestive of an ongoing/active immunologic condition. Because her symptoms were not progressive (with the possible exception of increased atrophy) we felt that an active immune condition was unlikely and agreed to hold off on IVIG. From a symptom stand point she is about the same today as at her last visit. She continues to experience hypesthesias in her palms and medial forearm. She has burning discomfort on her palms. She continues to have atrophy on her right medial forearm. Hand strength is unchanged. Right hand cramps persists. From a functional standpoint she is unchanged. Activities like using a knife or similar fine motor tasks are difficult but not impossible. She has no new areas of her body that were not affected before.     Prior laboratories:  5/17/23: RF 17. Normal/negative GM1, CRP, serum IF, SSa, SSb, ANCA  6/21/23: CSF RBC 0, WBC 0, protein 67.     Prior pertinent radiology work-up:  1/14/22: MRI right elbow showed small effusion at elbow. No nerve impingement.   1/14/22: MRI right brachial plexus with and without contrast showed no abnormalities in the plexus  6/7/23: MRI C spine showed multilevel moderate to severe cervical spondylosis with multilevel moderate to severe neural foraminal stenosis. Moderate spinal canal stenosis at C4-5 and C5-6. No abnormal cervical cord signal.  No abnormal enhancement of the cervical spinal cord or nerves.  6/8/23: MRI brachial plexus showed mild edema within the right brachial plexus. No enhancement. Left brachial plexus was normal.  Also noted on the imaging was T2 hyperintense signal with associated enhancement within the T4-T6 vertebral bodies, T4 spinous process. These may represent benign process such as degenerative changes  especially signal within the vertebral bodies. However, signal within T4 spinous process  is indeterminant.   7/3/23: MRI T spine showed no abnormal enhancement in the thoracic spine. No significant spinal canal or neuroforaminal stenosis. Multiple benign vertebral hemangiomas.    Prior electrophysiologic work-up:  10/19/21: Nerve conduction studies/EMG performed at [facility] of neurology. On the report it was interpreted as a right and left medial cord plexopathy and an ulnar neuropathy. Data from the study is somewhat hard to interpret, but reported was an absent left MAC, reduced right KATHARINA, reduced left ulnar-D5 snap, small right ulnar-ADM motor with possible slowing across the elbow. The other motor and sensory responses were normal.   12/21/21: NCS/EMG at [facility]. I do not have the data from that study. A description of the findings states that the bilateral LAC and MACs were normal. The left ulnar and bilateral median snaps normal. Right ulnar snap reduced amplitude. Median motor both normal. Right ulnar showed reduced amplitued and CV slowing across elbow. Left ulnar motor normal.   6/23/22: NCS/EMG at [facility]. Just the right side studies. The right ulnar motor and sensory responses are reported as normal. EMG findings are harder to interpret based on the report, but some chronic reinnervation changes were reported in FDI, FCU, ADM, APB and triceps.   2/1/23: NCS EMG showed bilateral ulnar neuropathies across the elbows. Counseled her on conservative management of ulnar neuropathies. Also noted absent left MAC and denervation in right PT muscle. Right PT denervation is difficult to anatomically place. 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     Past Medical History:   Past Medical History:   Diagnosis Date    DDD (degenerative disc disease), cervical 10 years ago    Raynaud disease 2003     Past Surgical History:  Past Surgical History:   Procedure Laterality Date     SUCT EMANUEL LIPECTOMY,LOW EXTREM  07/13/99     HC SUCT EMANUEL LIPECTOMY,TRUNK  02/2003    IR LUMBAR EPIDURAL STEROID INJECTION  6/7/2002    LAMINECTOMY LUMBAR MINIMALLY INVASIVE ONE LEVEL  6/2002    RECONSTRUCT BREAST, IMPLANT PROSTHESIS, COMBINED  11/1997     Family history:    There is no known family history of hereditary neuropathies or other neuromuscular disorders.    Social History:    She denies tobacco, alcohol, or illicit drug use.     Medical Allergies:    Allergies   Allergen Reactions    Pcn [Penicillins] Hives     Current Medications:    Current Outpatient Medications   Medication    Calcium Carbonate-Vitamin D (CALCIUM + D PO)    diazepam (VALIUM) 2 MG tablet    Misc Natural Products ([employer] GLUCOS-CHONDROIT-MSM TS PO)    Misc Natural Products (FIBER 7) POWD    omega-3 fatty acids 1200 MG capsule    vitamin C with B complex (B COMPLEX-C) tablet     No current facility-administered medications for this visit.     Review of Systems: A complete review of systems was obtained and was negative except for what was noted above.    Physical examination:    BP (!) 179/95 (BP Location: Left arm, Patient Position: Sitting, Cuff Size: Adult Regular)   Pulse 69   Resp 20   Wt 48.1 kg (106 lb 1.6 oz)   LMP 05/15/2012   SpO2 98%   BMI 20.05 kg/m       General Appearance: NAD    Neurologic examination:    Mental status:  Patient is alert, attentive, and oriented x 3.  Language is coherent and fluent without  aphasia.  Memory, comprehension and ability to follow commands were intact.       Cranial nerves:   Pupils were round and reacted to light.  Extraocular movements full. No face, jaw, palate or tongue weakness or atrophy. Hearing was grossly intact.      Motor exam:  There is atrophy of FDI on both sides, right > left. APB is atrophic on the right > left as well. There is also atrophy of distal forearm extensor and anterior forearm muscles on the right. Manual muscle testing revealed the following MRC grade muscle power:   Right Left   Neck flexion 5     Neck extension: 5    Shoulder abduction:  5 5   Elbow extension: 5 5   Elbow flexion:  5 5   Wrist flexion:  5 5   Wrist flexion: 5 5   Wrist pronation 4 5   Finger extension:  4 5   APB 4 4   FDI 4- 4   Hip Flexion 5 5   Knee extension 5 5   Dorsiflexion 5 5   Plantar flexion 5 5   Red indicates worse when compared to last examination  Green indicates improved when compared to last examination    Complex motor skills: No tremor or ataxia    Sensory exam: Pin patchy but reduced in palm and forearm on both sides. Medial hand and forearm more dense. Vibration intact in fingers.     Gait: Narrow and stable.      Deep tendon reflexes:   Right Left   Triceps 2 2   Biceps 2 2   Brachioradialis 2 2   Knee jerk 2 2   Ankle jerk 2 2      Assessment and plan:    Brii Rainey is a 63 year old woman who developed brachial plexopathies (medial cord predominant) after a microfocused ultrasound procedure called ultherapy was administrated to her axilla. After our last visit we explored the possibility that there may be an active and possibly immune component to the condition. Although MRI showed mild plexus edema, there was no enhancement. CSF protein was only modestly elevated. In part based on these findings we discussed the pros and cons of a treatment trial with IVIG. Considering that her symptoms had been stable we felt that these imaging and CSF protein findings were not enough to be confident that there is an active or immune component to her condition, and thus elected not to proceed with IVIG.  We again discussed prognosis of nerve repair and regeneration. Considering she more than 2 years out from the injury further nerve regeneration is unlikely at this point. I will also see her back in 12 months for neurologic surveillance. Should she develop new symptoms I would like to hear from her before then. New or progressive symptoms may change our approach to her plexopathy.   ---

## 2024-06-30 ENCOUNTER — HEALTH MAINTENANCE LETTER (OUTPATIENT)
Age: 64
End: 2024-06-30

## 2024-07-31 ENCOUNTER — LAB REQUISITION (OUTPATIENT)
Dept: LAB | Facility: CLINIC | Age: 64
End: 2024-07-31

## 2024-07-31 DIAGNOSIS — L65.9 NONSCARRING HAIR LOSS, UNSPECIFIED: ICD-10-CM

## 2024-07-31 PROCEDURE — 84443 ASSAY THYROID STIM HORMONE: CPT | Performed by: STUDENT IN AN ORGANIZED HEALTH CARE EDUCATION/TRAINING PROGRAM

## 2024-07-31 PROCEDURE — 84403 ASSAY OF TOTAL TESTOSTERONE: CPT | Performed by: STUDENT IN AN ORGANIZED HEALTH CARE EDUCATION/TRAINING PROGRAM

## 2024-08-01 LAB — TSH SERPL DL<=0.005 MIU/L-ACNC: 1.74 UIU/ML (ref 0.3–4.2)

## 2024-08-04 LAB — TESTOST SERPL-MCNC: <2 NG/DL (ref 8–60)

## 2024-09-17 NOTE — PROGRESS NOTES
[facility]    OUTPATIENT Occupational Therapy ORTHOPEDIC EVALUATION  PLAN OF TREATMENT FOR OUTPATIENT REHABILITATION  (COMPLETE FOR INITIAL CLAIMS ONLY)  Patient's Last Name, First Name, M.I.  YOB: 1960  Brii Rainey    Provider s Name:  [facility]   Medical Record No.  [id number]   Start of Care Date:  12/12/22   Onset Date:   11/23/22 (script date)   Treatment Diagnosis:  Brachial plexopathy Medical Diagnosis:  Brachial plexopathy       Goals:     12/12/22 0500   Goal #1   Goal #1 dressing   Previous Performance Level Independent   Current Functional Task Button   Current Performance Level moderate difficulty   STG Target Performance Shirt   STG Target Perform Level mild difficulty   Due Date  01/09/23   LTG Target Task/Performance No Difficulty  (use of AE)   Due date 02/06/23       Therapy Frequency:  1x/week  Predicted Duration of Therapy Intervention:  8 weeks    CODY Pena                 I CERTIFY THE NEED FOR THESE SERVICES FURNISHED UNDER        THIS PLAN OF TREATMENT AND WHILE UNDER MY CARE     (Physician attestation of this document indicates review and certification of the therapy plan).                     Certification Date From:  12/12/22   Certification Date To:  02/06/23    Referring Provider:  Lex Bernard    Initial Assessment        See Epic Evaluation SOC Date: 12/12/22                                                        Assessment/Plan:  Problem List Items Addressed This Visit    None  Visit Diagnoses       Fall, initial encounter    -  Primary    Bilateral hip pain        Relevant Medications    meloxicam (MOBIC) 15 MG tablet    methocarbamoL (ROBAXIN) 500 MG Tab    Other Relevant Orders    X-Ray Hips Bilateral 2 View Incl AP Pelvis (Completed)    Visual disturbances        Relevant Orders    Ambulatory referral/consult to Ophthalmology        Will obtain x-ray today   Trial of anti-inflammatory and muscle relaxer as prescribed  Discussed medication SE  Supportive care- rest, ice, heat, avoid heavy lifting, limit strenuous activity.   Referral to ophthalmology for ongoing visual disturbance, records requested from Dr. Carlson. Appt scheduled for tomorrow.   Follow up if symptoms worsen or fail to improve.  ER precautions for severe or worsening symptoms.     Hannah Anguiano NP  _____________________________________________________________________________________________________________________________________________________    History of Present Illness    CHIEF COMPLAINT:  Mr. Mendoza presents today for hip following a fall.     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 Initially, he had hip pain rating the pain as 10/10. The pain has since improved to 4/10. He denies hitting his head. He does not take a blood thinner. There was no loss of consciousness. Currently, the pain is more pronounced on the right hip and across the lower back, not localized to the spine. He feels pressure on both hips when sitting and experiences pain when turning over in bed. Despite the injury, he has continued engaging in physical activities including cutting grass, performing outdoor tasks, and completing an office renovation project. He is not currently taking anything for the pain. No known alleviating factors.     VISION  ISSUES:  He reports a floater in his left eye, described as a cloud-like appearance, which occurred after a severe coughing episode during a possible COVID infection around [place] 2023. He reports that this resulted in tissue detachment on his retina. He experiences difficulty and strain when reading contracts due to the floater, which moves when he shifts his eyes. He denies eye pain but reports dry eyes, possibly due to sleeping under a fan. He was prescribed eye drops by his ophthalmologist for this issue. He is following with external ophthalmologist and reports eye exams every 6 months. Followed with Dr. Carlson. Records requested. He would like a referral to an [facility] provider. Referral placed.     Past Medical History:  Past Medical History:   Diagnosis Date    Allergy     Basal cell carcinoma     Cancer     Prostate    Colon polyp     Erosive gastritis 6/25/2020    Insulin resistance 7/28/2021     Past Surgical History:   Procedure Laterality Date    ADENOIDECTOMY  1966    Tonsils and adenoids removed    COLONOSCOPY N/A 12/1/2015    Procedure: COLONOSCOPY;  Surgeon: Rod Rayo MD;  Location: [facility];  Service: Endoscopy;  Laterality: N/A;    COLONOSCOPY N/A 6/25/2020    Procedure: COLONOSCOPY;  Surgeon: Rod Rayo MD;  Location: [facility];  Service: Endoscopy;  Laterality: N/A;    ESOPHAGOGASTRODUODENOSCOPY N/A 6/25/2020    Procedure: ESOPHAGOGASTRODUODENOSCOPY (EGD);  Surgeon: Rod Rayo MD;  Location: [facility];  Service: Endoscopy;  Laterality: N/A;    PROSTATE SURGERY  2008    radical prostatectomy    TONSILLECTOMY      Child    VASECTOMY       Review of patient's allergies indicates:   Allergen Reactions    Penicillins      Other reaction(s): Swelling    Sulfa (sulfonamide antibiotics)      Other reaction(s): Rash    Bactrim  [sulfamethoxazole-trimethoprim] Rash     Social History     Tobacco Use    Smoking status: Former     Current packs/day: 0.00     Average packs/day: 0.3  packs/day for 5.0 years (1.3 ttl pk-yrs)     Types: Cigarettes, Cigars     Start date: 1977     Quit date: 1982     Years since quittin.8    Smokeless tobacco: Never    Tobacco comments:     Still crave one at times.  Makes me sick for days when I have one   Substance Use Topics    Alcohol use: Yes     Alcohol/week: 6.0 standard drinks of alcohol     Types: 6 Drinks containing 0.5 oz of alcohol per week     Comment: one a day    Drug use: No     Family History   Problem Relation Name Age of Onset    Cancer Mother Flora         Ovarian Cancer-     Current Outpatient Medications on File Prior to Visit   Medication Sig Dispense Refill    doxycycline monohydrate 100 mg Tab Take 1 tablet by mouth 2 (two) times daily.      hydrOXYzine HCL (ATARAX) 25 MG tablet Take 1/2 to 1 tablet for itching as needed. **MAY CAUSE DROWSINESS** 60 tablet 11    ketoconazole (NIZORAL) 2 % shampoo       montelukast (SINGULAIR) 10 mg tablet TAKE 1 TABLET BY MOUTH EVERY EVENING 90 tablet 3    OZEMPIC 0.25 mg or 0.5 mg (2 mg/3 mL) pen injector Inject 0.5 mg into the skin once a week. 9 mL 1    [DISCONTINUED] promethazine-dextromethorphan (PROMETHAZINE-DM) 6.25-15 mg/5 mL Syrp Take 5 mLs by mouth.      [DISCONTINUED] albuterol (PROVENTIL/VENTOLIN HFA) 90 mcg/actuation inhaler Inhale 2 puffs into the lungs every 6 (six) hours as needed.      [DISCONTINUED] methylPREDNISolone (MEDROL DOSEPACK) 4 mg tablet follow package directions (Patient not taking: Reported on 2024) 21 tablet 0     No current facility-administered medications on file prior to visit.     Review of Systems   Constitutional:  Negative for appetite change, chills, fatigue and fever.   HENT:  Negative for congestion, rhinorrhea and sore throat.    Eyes:  Positive for visual disturbance. Negative for pain, discharge, redness and itching.   Respiratory:  Negative for cough and shortness of breath.    Cardiovascular:  Negative for chest pain, palpitations and  leg swelling.   Gastrointestinal:  Negative for abdominal pain, diarrhea and vomiting.   Genitourinary:  Negative for difficulty urinating, dysuria and hematuria.   Musculoskeletal:  Positive for arthralgias and back pain. Negative for gait problem, joint swelling, myalgias, neck pain and neck stiffness.   Skin:  Negative for rash and wound.   Neurological:  Negative for dizziness, weakness and headaches.   Psychiatric/Behavioral:  Negative for behavioral problems. The patient is not nervous/anxious.      Vitals:    09/17/24 1301 09/17/24 1356   BP: (!) 158/73 132/74   BP Location:  Right arm   Patient Position:  Sitting   BP Method:  Large (Manual)   Pulse: 72    Weight: 100.7 kg (222 lb 1.6 oz)    Height: 6' (1.829 m)      Wt Readings from Last 3 Encounters:   09/17/24 100.7 kg (222 lb 1.6 oz)   06/25/24 98 kg (216 lb)   03/11/24 104.8 kg (231 lb)     Physical Exam  Vitals reviewed.   Constitutional:       General: He is not in acute distress.     Appearance: Normal appearance. He is not ill-appearing.   HENT:      Head: Normocephalic and atraumatic.      Right Ear: External ear normal.      Left Ear: External ear normal.      Nose: Nose normal.   Eyes:      Extraocular Movements: Extraocular movements intact.      Conjunctiva/sclera: Conjunctivae normal.      Pupils: Pupils are equal, round, and reactive to light.   Cardiovascular:      Rate and Rhythm: Normal rate.      Heart sounds: Normal heart sounds.   Pulmonary:      Effort: Pulmonary effort is normal. No respiratory distress.      Breath sounds: Normal breath sounds.   Abdominal:      General: Abdomen is flat. There is no distension.   Musculoskeletal:         General: Normal range of motion.      Cervical back: Normal range of motion.      Lumbar back: No spasms, tenderness or bony tenderness. Normal range of motion.      Right hip: Tenderness present. Normal range of motion. Normal strength.      Left hip: Tenderness present. Normal range of motion.  Normal strength.   Skin:     General: Skin is warm and dry.      Capillary Refill: Capillary refill takes less than 2 seconds.      Coloration: Skin is not pale.      Findings: No rash.   Neurological:      General: No focal deficit present.      Mental Status: He is alert and oriented to person, place, and time. Mental status is at baseline.      GCS: GCS eye subscore is 4. GCS verbal subscore is 5. GCS motor subscore is 6.      Cranial Nerves: No facial asymmetry.      Sensory: Sensation is intact.      Motor: Motor function is intact. No weakness, tremor or seizure activity.      Coordination: Coordination is intact. Coordination normal.      Gait: Gait is intact. Gait normal.   Psychiatric:         Mood and Affect: Mood normal.         Speech: Speech normal. Speech is not rapid and pressured, delayed or slurred.         Behavior: Behavior normal. Behavior is not agitated, slowed, aggressive, withdrawn, hyperactive or combative. Behavior is cooperative.         Thought Content: Thought content normal.         Judgment: Judgment normal.       Health Maintenance   Topic Date Due    High Dose Statin  Never done    Lipid Panel  03/11/2025    Colorectal Cancer Screening  06/25/2025    TETANUS VACCINE  11/25/2025    Hepatitis C Screening  Completed    Shingles Vaccine  Completed    Abdominal Aortic Aneurysm Screening  Completed     DISCLAIMER: This note was compiled by using a speech recognition dictation system and therefore please be aware that typographical / speech recognition errors can and do occur.  Please contact me if you see any errors specifically.  Consent was obtained for [employer] recording system prior to the visit.    Visit today included increased complexity associated with the care of the episodic problem - see above- addressed and managing the longitudinal care of the patient due to the serious and/or complex managed problem(s) - see above.

## 2024-11-17 ENCOUNTER — HEALTH MAINTENANCE LETTER (OUTPATIENT)
Age: 64
End: 2024-11-17

## 2025-04-16 ENCOUNTER — TELEPHONE (OUTPATIENT)
Dept: ORTHOPEDICS | Facility: CLINIC | Age: 65
End: 2025-04-16
Payer: COMMERCIAL

## 2025-04-16 NOTE — TELEPHONE ENCOUNTER
Brii is now scheduled for the first opening we had access to--June 17.     If any changes can be made to be seen sooner please reach out to Nicole at Phone listed.  [phone]    She is also on the wait list.     Thank you

## 2025-04-16 NOTE — TELEPHONE ENCOUNTER
Left Voicemail (1st Attempt) and Sent Mychart (1st Attempt) for the patient to call back and schedule the following:    Appointment type: New Knee  Provider: Dr. Costello  Return date: next available  Specialty phone number: [phone]  Appt notes: L knee pain, referred by Dr. Carreno  Ok to schedule per Crista.

## 2025-06-10 DIAGNOSIS — M25.562 LEFT KNEE PAIN, UNSPECIFIED CHRONICITY: Primary | ICD-10-CM

## 2025-06-11 ENCOUNTER — OFFICE VISIT (OUTPATIENT)
Dept: NEUROLOGY | Facility: CLINIC | Age: 65
End: 2025-06-11
Payer: COMMERCIAL

## 2025-06-11 ENCOUNTER — LAB (OUTPATIENT)
Dept: LAB | Facility: CLINIC | Age: 65
End: 2025-06-11
Payer: COMMERCIAL

## 2025-06-11 VITALS
RESPIRATION RATE: 16 BRPM | DIASTOLIC BLOOD PRESSURE: 82 MMHG | HEART RATE: 71 BPM | SYSTOLIC BLOOD PRESSURE: 172 MMHG | OXYGEN SATURATION: 100 % | WEIGHT: 104.7 LBS | BODY MASS INDEX: 19.78 KG/M2

## 2025-06-11 DIAGNOSIS — G54.0 BRACHIAL PLEXITIS: ICD-10-CM

## 2025-06-11 DIAGNOSIS — G54.0 BRACHIAL PLEXITIS: Primary | ICD-10-CM

## 2025-06-11 LAB
INR PPP: 0.87 (ref 0.85–1.15)
LAB ORDER RESULT STATUS: NORMAL
Lab: NORMAL
MCV RBC AUTO: 95 FL (ref 78–100)
PERFORMING LABORATORY: NORMAL
PLATELET # BLD AUTO: 320 10E3/UL (ref 150–450)
PROTHROMBIN TIME: 12.1 SECONDS (ref 11.8–14.8)
TEST NAME: NORMAL

## 2025-06-11 RX ORDER — LISINOPRIL 5 MG/1
5 TABLET ORAL DAILY
COMMUNITY
Start: 2025-05-01

## 2025-06-11 RX ORDER — PROGESTERONE 100 MG/1
100 CAPSULE ORAL DAILY
COMMUNITY
Start: 2024-08-19

## 2025-06-11 ASSESSMENT — PAIN SCALES - GENERAL: PAINLEVEL_OUTOF10: NO PAIN (0)
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
## 2025-06-11 NOTE — NURSING NOTE
Chief Complaint   Patient presents with    RECHECK     Return neurology       BP (!) 172/82 (BP Location: Left arm, Patient Position: Sitting, Cuff Size: Adult Regular)   Pulse 71   Resp 16   Wt 47.5 kg (104 lb 11.2 oz)   LMP 05/15/2012   SpO2 100%   BMI 19.78 kg/m          Cathy Oneil
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
## 2025-06-12 LAB — MAYO MISC RESULT: NORMAL

## 2025-06-18 ENCOUNTER — TELEPHONE (OUTPATIENT)
Dept: NEUROLOGY | Facility: CLINIC | Age: 65
End: 2025-06-18
Payer: COMMERCIAL

## 2025-06-18 NOTE — TELEPHONE ENCOUNTER
Caller spoke with the pt to schedule a return appt with Dr. Bernard in Sept.    Pt stated they are not able to schedule, as they discovered their insurance doesn't cover visits with the provider.    Pt declined to schedule at this time       Padmini Esparza on 6/18/2025 at 1:32 PM

## 2025-07-14 ENCOUNTER — TELEPHONE (OUTPATIENT)
Dept: ORTHOPEDICS | Facility: CLINIC | Age: 65
End: 2025-07-14
Payer: COMMERCIAL

## 2025-07-14 NOTE — TELEPHONE ENCOUNTER
Other: Nicole had an appointment on 06/17/25 with Dr. Costello but had to cancel due to her  having surgery. She wants to reschedule her ment with Dr. Costello but I am not able to schedule due scheduling restrictions. Please contact patient to assist with scheduling. Thank you     Could we send this information to you in [employer] or would you prefer to receive a phone call?:   Patient would prefer a phone call   Okay to leave a detailed message?: Yes at Cell number on file:    Telephone Information:   Mobile [phone]

## 2025-07-15 NOTE — TELEPHONE ENCOUNTER
Patient confirmed scheduled appointment:  Date: 7/22/25  Time: 9:00am  Visit type: New Knee  Provider: Dr. Costello  Location: [facility]  Patient confirmed insurance

## 2025-07-19 ASSESSMENT — KOOS JR
HOW SEVERE IS YOUR KNEE STIFFNESS AFTER FIRST WAKING IN MORNING: MILD
GOING UP OR DOWN STAIRS: SEVERE
KOOS JR SCORING: 61.58
RISING FROM SITTING: MILD
BENDING TO THE FLOOR TO PICK UP OBJECT: SEVERE
TWISING OR PIVOTING ON KNEE: MODERATE

## 2025-07-22 ENCOUNTER — OFFICE VISIT (OUTPATIENT)
Dept: ORTHOPEDICS | Facility: CLINIC | Age: 65
End: 2025-07-22
Payer: COMMERCIAL

## 2025-07-22 ENCOUNTER — ANCILLARY PROCEDURE (OUTPATIENT)
Dept: GENERAL RADIOLOGY | Facility: CLINIC | Age: 65
End: 2025-07-22
Attending: ORTHOPAEDIC SURGERY
Payer: COMMERCIAL

## 2025-07-22 VITALS — BODY MASS INDEX: 19.83 KG/M2 | HEIGHT: 60 IN | WEIGHT: 101 LBS

## 2025-07-22 DIAGNOSIS — M25.562 LEFT KNEE PAIN, UNSPECIFIED CHRONICITY: ICD-10-CM

## 2025-07-22 DIAGNOSIS — M17.12 PATELLOFEMORAL ARTHRITIS OF LEFT KNEE: ICD-10-CM

## 2025-07-22 DIAGNOSIS — M25.559 HIP PAIN, UNSPECIFIED LATERALITY: Primary | ICD-10-CM

## 2025-07-22 PROCEDURE — 73560 X-RAY EXAM OF KNEE 1 OR 2: CPT | Mod: LT | Performed by: RADIOLOGY

## 2025-07-22 PROCEDURE — 99203 OFFICE O/P NEW LOW 30 MIN: CPT | Performed by: ORTHOPAEDIC SURGERY

## 2025-07-22 PROCEDURE — 99207 XR KNEE BILATERAL 1/2 VIEWS: CPT | Mod: RT | Performed by: RADIOLOGY

## 2025-07-22 NOTE — PROGRESS NOTES
Patient is a 65-year-old female who I am seeing for the first time.  She is referred by Dr. Kinga Carreno.    Her history is briefly detailed below.  She reports left-sided anterior knee pain that has been present for several years.  She is a  and has been involved in weightlifting of all sorts for decades.  Her main presenting complaint was that she no longer could do stairs and was doing them 1 at a time.    She saw Dr. Viet Wasserman at [facility] who considered a partial knee replacement but felt that she was too young and active for this to be an ideal solution.  He sent her to Dr. Willy Hawkins.      She was also involved in physical therapy at which time she did a Rodriguez tape technique which did help relieve her pain.  Based on the response to Rodriguez taping she further went on to have a LRL/partial lateral facetectomy on 9/16/2024.    This surgery did not offer her substantial relief.  Most specifically she never regained the ability to do stairs comfortably and continues to do them 1 at a time.  She went on to have a Synvisc injection in October 2020 for which did not give her appreciable help, this was followed by a steroid injection in late November 2024.  This gave her some early relief of her pain.    She also has hip issues.  Because of right sided pain she underwent a right THR by Dr. Frank Cortes in 6/20/2020.  She has continued issues with right sided hip pain and told me that hip that was placed was too large of a head size and that is why she has pain.  She reports that this was told her by her Frank Cortes himself.    More recently she began to have increasing left-sided hip pain.  She has been followed for many years by Dr. Carreno.  A hip MRI was ordered and findings were reviewed.  She went on to have a left hip injection just about a week and a half ago of which she feels that minimal relief of her pain was present.    At this point in time she is here to  address her left knee pain, in particular she wants the ability to climb steps.    Op note by Dr. Miranda was reviewed.  It shows no tibiofemoral cartilage wear.    Physical exam reveals a lean female, BMI computes to 19.7  Hips were not examined  Examination of patient's left knee reveals benign-appearing incision.  No knee swelling.  Passive patella mobility 3 quadrants medial mobility 2 quadrants lateral mobility.  Good straight leg raising effort without lag.  Positive atrophy.  Range of motion 0-1 40.  Mild crepitus.    Examination of patient's right knee reveals no knee swelling.  Good straight leg raising effort without a lag.  Moderate crepitus through an active arc of motion.  Passive patella mobility 1 quadrant medial mobility 2 quadrants lateral mobility.    20 degree axial views today reveal bone-on-bone changes lateral patellofemoral compartment  Along with alignment views taken in January 2025 reveal neutral alignment left, right side has has weightbearing line fall through zone 1 medially    Assessment: 1.  End-stage left knee patellofemoral arthritis  2.  Status post right total knee replacement with continued pain  3.  Left hip labral tear with pain    Plan: I believe that she would be appropriate for patellofemoral arthroplasty despite her age of 65.  This is due to to her activity level level and her well-maintained tibiofemoral joint.  She has already failed a surgical procedure aimed at treating the arthritis, plus a steroid injection plus a hyaluronic acid injection and she still cannot perform the activities of daily living in particular stairclimbing    I did caution her that a patellofemoral arthroplasty will be coupled with probable loss of extreme flexion, and she will still have difficulty being comfortable with kneeling activities.    The goal of the procedure is to get back to pain-free ADLs in particular stairclimbing, and any further activities would be additive to our original  goal.    I would like to get a better handle on her hip pain before we do anything to her left knee.  To this and she was on it back to go back to Dr. Cortes.  I have suggested to Dr. Viet Cruz as a good alternative for hip evaluation.    She will see me back after the hip evaluation by Dr. Cruz.    Otilia Costello MD  Professor Orthopedic Surgery  [facility]     Copy to:tyler Carreno, [facility]

## 2025-07-22 NOTE — LETTER
7/22/2025      Brii Rainey  [address] [address]      Dear Colleague,    Thank you for referring your patient, Brii Rainey, to the [facility] [place]. Please see a copy of my visit note below.    Patient is a 65-year-old female who I am seeing for the first time.  She is referred by Dr. Kinga Carreno.    Her history is briefly detailed below.  She reports left-sided anterior knee pain that has been present for several years.  She is a  and has been involved in weightlifting of all sorts for decades.  Her main presenting complaint was that she no longer could do stairs and was doing them 1 at a time.    She saw Dr. Viet Wasserman at [facility] who considered a partial knee replacement but felt that she was too young and active for this to be an ideal solution.  He sent her to Dr. Willy Hawkins.      She was also involved in physical therapy at which time she did a Rodriguez tape technique which did help relieve her pain.  Based on the response to Rodriguez taping she further went on to have a LRL/partial lateral facetectomy on 9/16/2024.    This surgery did not offer her substantial relief.  Most specifically she never regained the ability to do stairs comfortably and continues to do them 1 at a time.  She went on to have a Synvisc injection in October 2020 for which did not give her appreciable help, this was followed by a steroid injection in late November 2024.  This gave her some early relief of her pain.    She also has hip issues.  Because of right sided pain she underwent a right THR by Dr. Frank Cortes in 6/20/2020.  She has continued issues with right sided hip pain and told me that hip that was placed was too large of a head size and that is why she has pain.  She reports that this was told her by her Frank Cortes himself.    More recently she began to have increasing left-sided hip pain.  She has been followed for many years by   Wai.  A hip MRI was ordered and findings were reviewed.  She went on to have a left hip injection just about a week and a half ago of which she feels that minimal relief of her pain was present.    At this point in time she is here to address her left knee pain, in particular she wants the ability to climb steps.    Op note by Dr. Miranda was reviewed.  It shows no tibiofemoral cartilage wear.    Physical exam reveals a lean female, BMI computes to 19.7  Hips were not examined  Examination of patient's left knee reveals benign-appearing incision.  No knee swelling.  Passive patella mobility 3 quadrants medial mobility 2 quadrants lateral mobility.  Good straight leg raising effort without lag.  Positive atrophy.  Range of motion 0-1 40.  Mild crepitus.    Examination of patient's right knee reveals no knee swelling.  Good straight leg raising effort without a lag.  Moderate crepitus through an active arc of motion.  Passive patella mobility 1 quadrant medial mobility 2 quadrants lateral mobility.    20 degree axial views today reveal bone-on-bone changes lateral patellofemoral compartment  Along with alignment views taken in January 2025 reveal neutral alignment left, right side has has weightbearing line fall through zone 1 medially    Assessment: 1.  End-stage left knee patellofemoral arthritis  2.  Status post right total knee replacement with continued pain  3.  Left hip labral tear with pain    Plan: I believe that she would be appropriate for patellofemoral arthroplasty despite her age of 65.  This is due to to her activity level level and her well-maintained tibiofemoral joint.  She has already failed a surgical procedure aimed at treating the arthritis, plus a steroid injection plus a hyaluronic acid injection and she still cannot perform the activities of daily living in particular stairclimbing    I did caution her that a patellofemoral arthroplasty will be coupled with probable loss of extreme  flexion, and she will still have difficulty being comfortable with kneeling activities.    The goal of the procedure is to get back to pain-free ADLs in particular stairclimbing, and any further activities would be additive to our original goal.    I would like to get a better handle on her hip pain before we do anything to her left knee.  To this and she was on it back to go back to Dr. Cortes.  I have suggested to Dr. Viet Cruz as a good alternative for hip evaluation.    She will see me back after the hip evaluation by Dr. Cruz.    Otilia Costello MD  Professor Orthopedic Surgery  [facility]           Again, thank you for allowing me to participate in the care of your patient.        Sincerely,        Otilia Costello MD    Electronically signed

## 2025-07-22 NOTE — NURSING NOTE
Reason For Visit:   Chief Complaint   Patient presents with    Consult     L knee--prior surgery for knee cap       Primary MD: No Ref-Primary, Physician  Referring MD: Wai    ?  No  Occupation .  Currently working? Yes.  Work status?  Part-time.  Date of surgery: 9/16/24  Type of surgery: Knee Arthroscopy with possible chondroplasty   PARTIAL LATERAL FACETECTOMY WITH LATERAL RETINACULAR LENGTHENING .  Smoker: No  Request smoking cessation information: No    Ht 1.524 m (5')   Wt 45.8 kg (101 lb)   [facility] 05/15/2012   BMI 19.73 kg/m      Pain Assessment  Patient Currently in Pain: Yes (3)

## 2025-07-24 NOTE — TELEPHONE ENCOUNTER
DIAGNOSIS:   M25.559 (ICD-10-CM) - Hip pain, unspecified laterality      APPOINTMENT DATE: 8/28/2025   NOTES STATUS DETAILS   OFFICE NOTE from referring provider Internal Otilia Costello MD (Ortho)  7/22/2025   OFFICE NOTE from other specialist Care Everywhere Kinga Carreno MD at [facility]: 6/3/2025 (e-visit)  5/14/2025 + more  Viet Wasserman MD at [facility]  1/31/2025 (L Knee)  Willy Miranda MD at [facility]  11/22/2024 (L Knee)  KIRK Naranjo at [facility]: 10/9/2024 (Back pain)  Wm Cortes MD at [facility] 11/1/2022 (s/p R Hip)   DISCHARGE REPORT from the ER Care Everywhere R calf pain 7/18/2022 ([facility])   OPERATIVE REPORT Internal L Knee 10/30/2024  R Hip replacement 6/1/2020   MEDICATION LIST Internal    EMG (for Spine) Internal 5/16/2025   LABS     MRI PACS L Hip: 5/8/2025  R Knee: 11/6/2024, 9/26/2024,   L Knee: 9/26/2023  T spine: 7/3/2023   XRAYS (IMAGES & REPORTS) PACS B/L Knee: 7/22/2025  Pelvis/Hips: 4/25/2025, 8/12/2022, 6/22/2021, 10/23/2020  Leg length: 1/31/2025, 1/11/2022  R Knee: 1/31/2025, 7/1/2024,   Lumbar: 6/21/2023, 1/11/2022  L Hip: 8/12/2022

## 2025-07-28 ENCOUNTER — TELEPHONE (OUTPATIENT)
Dept: ORTHOPEDICS | Facility: CLINIC | Age: 65
End: 2025-07-28
Payer: COMMERCIAL

## 2025-07-28 ASSESSMENT — HOOS JR
BENDING TO THE FLOOR TO PICK UP OBJECT: SEVERE
GOING UP OR DOWN STAIRS: MODERATE
SITTING: MODERATE
WALKING ON UNEVEN SURFACE: MODERATE
LYING IN BED (TURNING OVER, MAINTAINING HIP POSITION): MODERATE
HOOS JR TOTAL INTERVAL SCORE: 49.86
RISING FROM SITTING: MODERATE

## 2025-07-28 NOTE — TELEPHONE ENCOUNTER
Called patient and left message asking if an appointment for 7/29 at 8:20am with Dr. Cruz at our [place] Location would work.  Will make appointment, asked patient to call back to confirm if this time would work.   Carlie Pickett RN

## 2025-07-29 ENCOUNTER — OFFICE VISIT (OUTPATIENT)
Dept: ORTHOPEDICS | Facility: CLINIC | Age: 65
End: 2025-07-29
Payer: COMMERCIAL

## 2025-07-29 ENCOUNTER — TELEPHONE (OUTPATIENT)
Dept: ORTHOPEDICS | Facility: CLINIC | Age: 65
End: 2025-07-29

## 2025-07-29 VITALS — HEIGHT: 62 IN | WEIGHT: 97 LBS | BODY MASS INDEX: 17.85 KG/M2

## 2025-07-29 DIAGNOSIS — M25.559 HIP PAIN, UNSPECIFIED LATERALITY: ICD-10-CM

## 2025-07-29 PROCEDURE — 99214 OFFICE O/P EST MOD 30 MIN: CPT | Performed by: PHYSICIAN ASSISTANT

## 2025-07-29 NOTE — NURSING NOTE
"Brii Rainey's chief complaint for this visit includes:  Chief Complaint   Patient presents with    Left Hip - Pain       Referring Provider:  Otilia Costello MD  [address] [place] [id number]    Ht 1.575 m (5' 2\")   Wt 44 kg (97 lb)   LMP 05/15/2012   BMI 17.74 kg/m    Data Unavailable   Global Mental Health Score: (Patient-Rptd) (P) 12  Global Physical Health Score: (Patient-Rptd) (P) 15  PROMIS TOTAL - SUBSCORES: (Patient-Rptd) (P) 27  UCLA: 8  TED Gray 49.86     Pain increases with: Squatting, walking, bike, stair stepper  Previous surgeries: No  Previous injections within last 6 months: NO  Treatments done: Injection - 7/14 with mild relief, advil, tylenol.   Imaging completed: XR and MRI  Pain: 8/10  Concerns: What can/should she do for her hip pain.     Alberto Rosales, ATC            "

## 2025-07-29 NOTE — TELEPHONE ENCOUNTER
Procedure: LTHA  FAST TRACK OK  Facility: [facility] or [facility]  Length: 120 minutes  Anesthesia: Choice  Post-op appointments needed: 2 weeks Provider/PA only, 6 weeks with provider only.  Surgery packet/instructions given to patient?  Yes   PT: [facility]  PCP: [facility]  Pain medication/ clinic history No  Chem dep history No  Injection/Infusion medications No  Blood Thinner No    Teaching Flowsheet     Visit Type: In Clinic      Total Time Spent: 20 min.    Teaching Topic: Surgery teaching    Surgeon: Dr. Cruz  Type of Anesthesia: Choice    Pertinent Medical History: No Pertinent Medical History  Were medical conditions reviewed and appropriate for location? Yes  BMI:     Person(s) involved in teaching:   Patient  : No.   Verified Patient's Phone Number: YES: see chart    Caregiver//  Name: Sabas  Phone Number: in chart   Relationship:   Consent to Communicate on file: No  Authorization to Share Protected Health Information- Person to person communication signed by patient and authorized the following person or people:      Motivation Level:  Asks Questions: Yes  Eager to Learn: Yes  Cooperative: Yes  Receptive (willing/able to accept information): Yes  Any cultural factors/Jain beliefs that may influence understanding or compliance? No     Patient demonstrates understanding of the following:  Reason for the appointment, diagnosis and treatment plan: Yes  Knowledge of proper use of medications and conditions for which they are ordered (with special attention to potential side effects or drug interactions): Yes  Which situations necessitate calling provider and whom to contact: Yes     Teaching Concerns Addressed:   Proper use and care of medical equip, care aids, etc.: Yes  Nutritional needs and diet plan: Yes  Pain management techniques: NA  Wound Care: Yes  How and/when to access community resources: Yes  Need for pre-op with in 30 days: YES, will be done with PCP. I asked them to ensure  they go over their daily medications during this visit and discuss what medications need to be stopped before surgery and when. If you are doing a pre-op with your PCP and they are not within the [facility], I ask them to fax it to our pre-op office. Patient verbalized understanding.       Does patient have difficulty getting a ride to appointments (post-ops, PT/OT): No  Patient's plan after discharge: home with family or spouse     Instructional Materials Used/Given:  two bottles of chlorhexidine soap and a surgery packet given to patient in clinic. Instructed patient to buy or get two 8 ounces bottles of antiseptic surgical soap called 4% CHG. Common name brand of this soap are Hibiclens and Exidine. I told them they can find this at their local pharmacy, clinic or retail store. If they have trouble finding it, I told them to ask their pharmacist to help them find a substitute.   - Important Contact Info/Phone Numbers: emphasizing clinic number [phone] and after hours number [phone]  - Map/location of surgery and follow-up appointments  - Showering instructions  - Stoplight Tool     -Next step: schedule a surgery date.    Carlie Pickett RN

## 2025-07-29 NOTE — PROGRESS NOTES
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  We discussed her symptoms are certainly consistent with that the moderate to advanced changes appreciated on on both x-ray and recent MRI imaging.  Though it is questionable how much if any relief she has gotten from  recent CSI injection on 7/14, she does report that she has  been able to ambulate with more ease compared to a recent vacation she took.  Given relatively recent timeframe for her pain onset as well as overall ongoing ability to do many of her desired activities, we discussed monitoring symptoms over the next 3 to 6 months.  She will follow-up as needed should her function continue to decline and she wishes to consider surgical options which would involve total hip replacement.    We discussed nonoperative treatment options including oral anti-inflammatories over-the-counter, steroid injection no more than every 3 months and no more than 3/year.  Also recommended she avoid more than 5 or 6 per joint location in her lifetime due to risk of prosthetic joint injections should she go on to have the joint replaced in the future.  She was provided the information of our nonoperative sports medicine team to pursue future injection as desired.  She understands she would not be able to schedule surgery for 3 months from the most recent injection.    We also discussed the nature of the mechanics of total hip arthroplasties and how it is impacted by degenerative changes of her spine which is likely quite advanced though we do not have complete imaging today.  Given her size and activity level, we will look into obtaining if possible a dual mobility head. Surgical teaching was provided by nursing today. Risks and benefits of surgery were reviewed including risk of blood clot, infection, dislocation, fracture and risk of anaesthesia. Surgical recovery  timeline was also reviewed.     Patient was seen and evaluated with Dr. Cruz today.       Molly Zamora PA-C  7/29/2025 9:10 AM  [facility]  Orthopaedic Surgery        Chief Complaint: Pain of the Left Hip      Physician:  Otilia Costello    HPI: Brii Rainey is a 65 year old female who presents today for evaluation of left hip pain at the referral of Dr. Costello whom she sees for left knee pain with diagnosis of end-stage patellofemoral arthritis.     Left hip pain onset in May of this year following ground-level fall where she tripped carrying her laundry basket.  She had subsequent localized bruising and swelling to the lateral hip which she treated with compression and icing.  Swelling went down but she developed sharp pains in her groin that have not subsided.  Pain is daily, worse with prolonged sitting and she had great difficulty walking on a recent trip to [place] with her son.    MRI was ordered by Non operative sports physician, Dr. Gonzales,  at [facility] and revealed labral tearing and subchondral cysts, chondral wear.  She recently underwent steroid injection to the left hip under ultrasound guidance on 7/14/2025.  She states in comparison to previous injections, she did not have the normal sensation of the joint feeling with fluid nor did she feel there was any immediate or substantial long-term relief.  However, she is walking with a little more ease in the last week.    She has history of right total hip arthroplasty in 2020 with  of [facility].  Her pain overall improved following surgery, however she does note intermittent pains in this joint as well as sensation of more prominence at the lateral hip.  She will also get burning at the iliac crest since the surgery.  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  She has some intermittent low back pain but no pain consistently in the back.  She notes occasional intermittent  shooting pains radiating below the left knee but this is not her primary pain.    Location of symptoms:  left groin  Onset: 3 months  Duration of symptoms: daily  Quality of symptoms: catching, sharp pain, radiates lateral  Severity: 8/10  Alleviating: activity modification  Exacerbating: activities: squatting, walking, bike, stair stepper  Previous Treatments: Previous treatments include activity modification, oral pain medication    TED Jr: 49.86  PROMIS Mental: (Patient-Rptd) (P) 12  PROMIS Physical: (Patient-Rptd) (P) 15  PROMIS Total: (Patient-Rptd) (P) 27  UCLA Activity Scale: 8    MEDICAL HISTORY:   Past Medical History:   Diagnosis Date    DDD (degenerative disc disease), cervical 10 years ago    Raynaud disease 2003       Pertinent negatives:  Patient has no history of DVT or PE. Discussed risk factors.  Only baseline medication is Lisinopril for hypertension, well-controlled    Medications:     Current Outpatient Medications:     Calcium Carbonate-Vitamin D (CALCIUM + D PO), 600-400, 1 daily, Disp: , Rfl:     lisinopril (ZESTRIL) 5 MG tablet, Take 5 mg by mouth daily., Disp: , Rfl:     Misc Natural Products ([employer] GLUCOS-CHONDROIT-MSM TS PO), Take  by mouth. 2 tablets daily , Disp: , Rfl:     Misc Natural Products (FIBER 7) POWD, 2 tablets daily, Disp: , Rfl:     progesterone (PROMETRIUM) 100 MG capsule, Take 100 mg by mouth daily., Disp: , Rfl:     vitamin C (ASCORBIC ACID) 500 MG tablet, Take 500 mg by mouth daily, Disp: , Rfl:     vitamin C with B complex (B COMPLEX-C) tablet, Take 1 tablet by mouth daily, Disp: , Rfl:     Allergies: Penicillins    SURGICAL HISTORY:   Past Surgical History:   Procedure Laterality Date    HC SUCT EMANUEL LIPECTOMY,LOW EXTREM  07/13/99    HC SUCT EMANUEL LIPECTOMY,TRUNK  02/2003    IR LUMBAR EPIDURAL STEROID INJECTION  6/7/2002    LAMINECTOMY LUMBAR MINIMALLY INVASIVE ONE LEVEL  6/2002    RECONSTRUCT BREAST, IMPLANT PROSTHESIS, COMBINED  11/1997       HISTORY:   Family History  "  Problem Relation Age of Onset    Cancer Father 67        pancreatic    Diabetes Father     C.A.D. Paternal Grandfather         d. MI    Cancer - colorectal Maternal Grandfather     Heart Disease Maternal Grandmother         CHF    Heart Disease Paternal Grandmother         CHF       SOCIAL HISTORY:   Social History     Tobacco Use    Smoking status: Former     Types: Cigarettes    Smokeless tobacco: Never    Tobacco comments:     rare use    Substance Use Topics    Alcohol use: Yes     Comment: 3-4 mixed drinks on weekends       REVIEW OF SYSTEMS:  The comprehensive review of systems from the intake form was reviewed with the patient.  No fever, weight change or fatigue. No dry eyes. No oral ulcers, sore throat or voice change. No palpitations, syncope, angina or edema.  No chest pain, excessive sleepiness, shortness of breath or hemoptysis.   No abdominal pain, nausea, vomiting, diarrhea or heartburn.  No skin rash. No focal weakness or numbness. No bleeding or lymphadenopathy. No rhinitis or hives.     Exam:  On physical examination the patient appears the stated age, is in no acute distress, alert and oriented, affect is appropriate, and breathing is non-labored.  Vitals are documented in the EMR and have been reviewed:    Ht 1.575 m (5' 2\")   Wt 44 kg (97 lb)   LMP 05/15/2012   BMI 17.74 kg/m    5' 2\"  Body mass index is 17.74 kg/m .    Rises from chair: with ease  Gait: normal   Trendelenburg test: neg  Gains the exam table: unassisted    RIGHT hip subjective:  Abd: 40  Add: 10  Flexion: 105  IRF: 20  ERF: 30  Impingement test: neg  Tenderness to palpation: none    LEFT hip subjective:  Abd: 40  Add: 10  Flexion: 100  IRF:10  ERF: 40  Impingement test: positive FADIR  Tenderness to palpation no point tenderness at greater troch or anterior groin    Distal the circulatory, motor, and sensation exam is intact with 5/5 EHL, gastroc-soleus, and tibialis anterior.  Sensation to light touch is intact.  Dorsalis " pedis and posterior tibialis pulses are palpable.  There are no sores on the feet, no bruising, and no lymphedema.    Imaging:   AP pelvis and crosstable lateral views of the left hip were reviewed from outside clinic dated 4/25/2025.  This demonstrates joint space narrowing with tonnis grade 2-3 degenerative changes to the left hip joint.    MRI images of the left hip from [facility] dated 5//8/25 were reviewed and demonstrate anterior superior labral degenerative tearing, moderate cartilage thinning with associated subchondral cystic changes within the acetabulum and presence of small effusion.  No significant tendinopathy or tearing.  No fractures or bony lesions.

## 2025-07-29 NOTE — PROGRESS NOTES
I have personally examined this patient and have reviewed the clinical presentation and progress note with the resident. I agree with the treatment plan as outlined. The plan was formulated with the resident on the day of the resident's dictation.  We discussed that given the level of symptoms and radiographic changes that further non-operative management in the form injection and/or physical therapist directed exercises is not mandatory. We spent twenty minutes discussing total hip arthroplasty.  We discussed the implants, the procedure, the risks and benefits, and the post-operative course.  We discussed blood clots, blood clots to the lungs, injury to blood vessels and nerves, dislocation, infection, and leg length difference.  We went over templated radiographs.  We discussed at length the size of implants using her operative report from the contralateral side as a reference.  Discussed the utility of a dual mobility head and its pluses and minuses including potentially show lower dislocation right at the expense of intra prosthetic dislocation and open surgery.  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  We discussed that most patients do not have problems with these types of activities.  We discussed that she may be one of the people who does have problems in part because of the small implants and in part because of her lumbar spine disease.  We discussed that as part of the routine part of surgical care a qualified assist may finish closing the wound after I have left the room. All the patients questions were answered to the best of my ability.  All the patient's questions were answered to the best my ability.

## 2025-07-29 NOTE — LETTER
7/29/2025      Brii Rainey  [address] [facility] [address]      Dear Colleague,    Thank you for referring your patient, Brii Rainey, to the [facility]. Please see a copy of my visit note below.    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  We discussed her symptoms are certainly consistent with that the moderate to advanced changes appreciated on on both x-ray and recent MRI imaging.  Though it is questionable how much if any relief she has gotten from  recent CSI injection on 7/14, she does report that she has  been able to ambulate with more ease compared to a recent vacation she took.  Given relatively recent timeframe for her pain onset as well as overall ongoing ability to do many of her desired activities, we discussed monitoring symptoms over the next 3 to 6 months.  She will follow-up as needed should her function continue to decline and she wishes to consider surgical options which would involve total hip replacement.    We discussed nonoperative treatment options including oral anti-inflammatories over-the-counter, steroid injection no more than every 3 months and no more than 3/year.  Also recommended she avoid more than 5 or 6 per joint location in her lifetime due to risk of prosthetic joint injections should she go on to have the joint replaced in the future.  She was provided the information of our nonoperative sports medicine team to pursue future injection as desired.  She understands she would not be able to schedule surgery for 3 months from the most recent injection.    We also discussed the nature of the mechanics of total hip arthroplasties and how it is impacted by degenerative changes of her spine which is likely quite advanced though we do not have complete imaging today.  Given her size and activity level, we will look into obtaining  if possible a dual mobility head. Surgical teaching was provided by nursing today. Risks and benefits of surgery were reviewed including risk of blood clot, infection, dislocation, fracture and risk of anaesthesia. Surgical recovery timeline was also reviewed.     Patient was seen and evaluated with Dr. Cruz today.       Molly Zamora PA-C  7/29/2025 9:10 AM  [facility]  Orthopaedic Surgery        Chief Complaint: Pain of the Left Hip      Physician:  Otilia Costello    HPI: Brii Rainey is a 65 year old female who presents today for evaluation of left hip pain at the referral of Dr. Costello whom she sees for left knee pain with diagnosis of end-stage patellofemoral arthritis.     Left hip pain onset in May of this year following ground-level fall where she tripped carrying her laundry basket.  She had subsequent localized bruising and swelling to the lateral hip which she treated with compression and icing.  Swelling went down but she developed sharp pains in her groin that have not subsided.  Pain is daily, worse with prolonged sitting and she had great difficulty walking on a recent trip to [place] with her son.    MRI was ordered by Non operative sports physician, Dr. Gonzales,  at [facility] and revealed labral tearing and subchondral cysts, chondral wear.  She recently underwent steroid injection to the left hip under ultrasound guidance on 7/14/2025.  She states in comparison to previous injections, she did not have the normal sensation of the joint feeling with fluid nor did she feel there was any immediate or substantial long-term relief.  However, she is walking with a little more ease in the last week.    She has history of right total hip arthroplasty in 2020 with  of [facility].  Her pain overall improved following surgery, however she does note intermittent pains in this joint as well as sensation of more prominence at the lateral hip.  She will also get burning at the  iliac crest since the surgery.  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  She has some intermittent low back pain but no pain consistently in the back.  She notes occasional intermittent shooting pains radiating below the left knee but this is not her primary pain.    Location of symptoms:  left groin  Onset: 3 months  Duration of symptoms: daily  Quality of symptoms: catching, sharp pain, radiates lateral  Severity: 8/10  Alleviating: activity modification  Exacerbating: activities: squatting, walking, bike, stair stepper  Previous Treatments: Previous treatments include activity modification, oral pain medication    HOOS Jr: 49.86  PROMIS Mental: (Patient-Rptd) (P) 12  PROMIS Physical: (Patient-Rptd) (P) 15  PROMIS Total: (Patient-Rptd) (P) 27  [facility] Activity Scale: 8    MEDICAL HISTORY:   Past Medical History:   Diagnosis Date     DDD (degenerative disc disease), cervical 10 years ago     Raynaud disease 2003       Pertinent negatives:  Patient has no history of DVT or PE. Discussed risk factors.  Only baseline medication is Lisinopril for hypertension, well-controlled    Medications:     Current Outpatient Medications:      Calcium Carbonate-Vitamin D (CALCIUM + D PO), 600-400, 1 daily, Disp: , Rfl:      lisinopril (ZESTRIL) 5 MG tablet, Take 5 mg by mouth daily., Disp: , Rfl:      Misc Natural Products ([employer] GLUCOS-CHONDROIT-MSM TS PO), Take  by mouth. 2 tablets daily , Disp: , Rfl:      Misc Natural Products (FIBER 7) POWD, 2 tablets daily, Disp: , Rfl:      progesterone (PROMETRIUM) 100 MG capsule, Take 100 mg by mouth daily., Disp: , Rfl:      vitamin C (ASCORBIC ACID) 500 MG tablet, Take 500 mg by mouth daily, Disp: , Rfl:      vitamin C with B complex (B COMPLEX-C) tablet, Take 1 tablet by mouth daily, Disp: , Rfl:     Allergies: Penicillins    SURGICAL HISTORY:   Past Surgical History:   Procedure Laterality Date     HC SUCT EMANUEL LIPECTOMY,LOW EXTREM  07/13/99      " DARLENE EMANUEL LIPECTOMY,TRUNK  02/2003     IR LUMBAR EPIDURAL STEROID INJECTION  6/7/2002     LAMINECTOMY LUMBAR MINIMALLY INVASIVE ONE LEVEL  6/2002     RECONSTRUCT BREAST, IMPLANT PROSTHESIS, COMBINED  11/1997       HISTORY:   Family History   Problem Relation Age of Onset     Cancer Father 67        pancreatic     Diabetes Father      C.A.D. Paternal Grandfather         d. MI     Cancer - colorectal Maternal Grandfather      Heart Disease Maternal Grandmother         CHF     Heart Disease Paternal Grandmother         CHF       SOCIAL HISTORY:   Social History     Tobacco Use     Smoking status: Former     Types: Cigarettes     Smokeless tobacco: Never     Tobacco comments:     rare use    Substance Use Topics     Alcohol use: Yes     Comment: 3-4 mixed drinks on weekends       REVIEW OF SYSTEMS:  The comprehensive review of systems from the intake form was reviewed with the patient.  No fever, weight change or fatigue. No dry eyes. No oral ulcers, sore throat or voice change. No palpitations, syncope, angina or edema.  No chest pain, excessive sleepiness, shortness of breath or hemoptysis.   No abdominal pain, nausea, vomiting, diarrhea or heartburn.  No skin rash. No focal weakness or numbness. No bleeding or lymphadenopathy. No rhinitis or hives.     Exam:  On physical examination the patient appears the stated age, is in no acute distress, alert and oriented, affect is appropriate, and breathing is non-labored.  Vitals are documented in the EMR and have been reviewed:    Ht 1.575 m (5' 2\")   Wt 44 kg (97 lb)   LMP 05/15/2012   BMI 17.74 kg/m    5' 2\"  Body mass index is 17.74 kg/m .    Rises from chair: with ease  Gait: normal   Trendelenburg test: neg  Gains the exam table: unassisted    RIGHT hip subjective:  Abd: 40  Add: 10  Flexion: 105  IRF: 20  ERF: 30  Impingement test: neg  Tenderness to palpation: none    LEFT hip subjective:  Abd: 40  Add: 10  Flexion: 100  IRF:10  ERF: 40  Impingement test: " positive FADIR  Tenderness to palpation no point tenderness at greater troch or anterior groin    Distal the circulatory, motor, and sensation exam is intact with 5/5 EHL, gastroc-soleus, and tibialis anterior.  Sensation to light touch is intact.  Dorsalis pedis and posterior tibialis pulses are palpable.  There are no sores on the feet, no bruising, and no lymphedema.    Imaging:   AP pelvis and crosstable lateral views of the left hip were reviewed from outside clinic dated 4/25/2025.  This demonstrates joint space narrowing with tonnis grade 2-3 degenerative changes to the left hip joint.    MRI images of the left hip from [facility] dated 5//8/25 were reviewed and demonstrate anterior superior labral degenerative tearing, moderate cartilage thinning with associated subchondral cystic changes within the acetabulum and presence of small effusion.  No significant tendinopathy or tearing.  No fractures or bony lesions.    I have personally examined this patient and have reviewed the clinical presentation and progress note with the resident. I agree with the treatment plan as outlined. The plan was formulated with the resident on the day of the resident's dictation.  We discussed that given the level of symptoms and radiographic changes that further non-operative management in the form injection and/or physical therapist directed exercises is not mandatory. We spent twenty minutes discussing total hip arthroplasty.  We discussed the implants, the procedure, the risks and benefits, and the post-operative course.  We discussed blood clots, blood clots to the lungs, injury to blood vessels and nerves, dislocation, infection, and leg length difference.  We went over templated radiographs.  We discussed at length the size of implants using her operative report from the contralateral side as a reference.  Discussed the utility of a dual mobility head and its pluses and minuses including potentially show lower  dislocation right at the expense of intra prosthetic dislocation and open surgery.  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  We discussed that most patients do not have problems with these types of activities.  We discussed that she may be one of the people who does have problems in part because of the small implants and in part because of her lumbar spine disease.  We discussed that as part of the routine part of surgical care a qualified assist may finish closing the wound after I have left the room. All the patients questions were answered to the best of my ability.  All the patient's questions were answered to the best my ability.      Again, thank you for allowing me to participate in the care of your patient.        Sincerely,        Viet Cruz MD    Electronically signed

## 2025-08-19 ENCOUNTER — TELEPHONE (OUTPATIENT)
Dept: ORTHOPEDICS | Facility: CLINIC | Age: 65
End: 2025-08-19
Payer: COMMERCIAL

## 2025-08-28 ENCOUNTER — PRE VISIT (OUTPATIENT)
Dept: ORTHOPEDICS | Facility: CLINIC | Age: 65
End: 2025-08-28

## (undated) RX ORDER — LIDOCAINE HYDROCHLORIDE 10 MG/ML
INJECTION, SOLUTION EPIDURAL; INFILTRATION; INTRACAUDAL; PERINEURAL
Status: DISPENSED
Start: 2023-06-21